# Patient Record
Sex: FEMALE | Race: WHITE | NOT HISPANIC OR LATINO | Employment: FULL TIME | URBAN - METROPOLITAN AREA
[De-identification: names, ages, dates, MRNs, and addresses within clinical notes are randomized per-mention and may not be internally consistent; named-entity substitution may affect disease eponyms.]

---

## 2018-05-09 ENCOUNTER — HOSPITAL ENCOUNTER (EMERGENCY)
Facility: HOSPITAL | Age: 33
Discharge: HOME | End: 2018-05-09

## 2018-05-09 VITALS
OXYGEN SATURATION: 100 % | DIASTOLIC BLOOD PRESSURE: 76 MMHG | HEART RATE: 81 BPM | TEMPERATURE: 98 F | WEIGHT: 167 LBS | BODY MASS INDEX: 26.84 KG/M2 | RESPIRATION RATE: 18 BRPM | HEIGHT: 66 IN | SYSTOLIC BLOOD PRESSURE: 140 MMHG

## 2018-05-09 DIAGNOSIS — R52 PAIN: ICD-10-CM

## 2018-05-09 DIAGNOSIS — S80.11XA CONTUSION OF RIGHT KNEE AND LOWER LEG, INITIAL ENCOUNTER: Primary | ICD-10-CM

## 2018-05-09 DIAGNOSIS — S80.01XA CONTUSION OF RIGHT KNEE AND LOWER LEG, INITIAL ENCOUNTER: Primary | ICD-10-CM

## 2018-05-09 PROCEDURE — 73590 X-RAY EXAM OF LOWER LEG: CPT | Mod: 26,RT,, | Performed by: RADIOLOGY

## 2018-05-09 PROCEDURE — 99283 EMERGENCY DEPT VISIT LOW MDM: CPT | Mod: 25

## 2018-05-09 PROCEDURE — 73590 X-RAY EXAM OF LOWER LEG: CPT | Mod: TC,FY,RT

## 2018-05-10 NOTE — ED TRIAGE NOTES
PATIENT C/O RIGHT SHIN PAIN AND SWELLING DUE TO HITTING RIGHT SHIN ON EDGE OF MARBLE BATH TUB YESTERDAY AFTER SLIPPING WHILE ATTEMPTING TO GET INTO BATH TUB. PATIENT REPORTS SHE HAS BEEN ON HER FEET ALL DAY, NOTICED THE SWELLING TO THE AREA, TENDER TO TOUCH. CONTUSION NOTED TO RIGHT SHIN. RATES PAIN 2/10. RESPIRATIONS EVEN AND UNLABORED. NO DISTRESS NOTED.

## 2018-05-10 NOTE — ED PROVIDER NOTES
Encounter Date: 5/9/2018       History     Chief Complaint   Patient presents with    Leg Injury     RIGHT SHIN CONTUSION, SWELLING NOTED DUE TO HITTING RIGHT SHIN ON EDGE OF MARBLE BATHTUB YESTERDAY AFTER SLIPPING ATTEMPTING TO GET IN THE BATH TUB     Hit R shin on bathtub yesterday, pain and tenderness to L shin today.  Worked all day as .            Review of patient's allergies indicates:  Allergies not on file  No past medical history on file.  No past surgical history on file.  No family history on file.  Social History   Substance Use Topics    Smoking status: Not on file    Smokeless tobacco: Not on file    Alcohol use Not on file     Review of Systems   Constitutional: Negative.    HENT: Negative.    Eyes: Negative.    Respiratory: Negative.    Cardiovascular: Negative.    Gastrointestinal: Negative.    Endocrine: Negative.    Genitourinary: Negative.    Musculoskeletal: Positive for gait problem.        Slight R antalgic gait   Skin: Positive for wound.        3 cm ecchymosis to R shin 10 cm proximal to foot.     Allergic/Immunologic: Negative.    Hematological: Negative.    Psychiatric/Behavioral: Negative.        Physical Exam     Initial Vitals [05/09/18 1902]   BP Pulse Resp Temp SpO2   (!) 140/76 81 18 97.9 °F (36.6 °C) 100 %      MAP       97.33         Physical Exam    Constitutional: She appears well-developed and well-nourished.   HENT:   Head: Normocephalic and atraumatic.   Eyes: EOM are normal. Pupils are equal, round, and reactive to light.   Neck: Normal range of motion. Neck supple.   Cardiovascular: Normal rate, regular rhythm, normal heart sounds and intact distal pulses.   Pulmonary/Chest: Breath sounds normal.   Abdominal: Soft. Bowel sounds are normal.   Musculoskeletal: Normal range of motion. She exhibits tenderness.   Tenderness to R shin   Skin: Skin is warm and dry. Capillary refill takes less than 2 seconds.         ED Course   Procedures  Labs Reviewed - No data to  display                               Clinical Impression:   The primary encounter diagnosis was Contusion of right knee and lower leg, initial encounter. A diagnosis of Pain was also pertinent to this visit.                           Hunter Neil MD  05/17/18 0768

## 2019-09-04 ENCOUNTER — HOSPITAL ENCOUNTER (EMERGENCY)
Facility: HOSPITAL | Age: 34
Discharge: HOME | End: 2019-09-04
Attending: FAMILY MEDICINE
Payer: COMMERCIAL

## 2019-09-04 VITALS
HEIGHT: 66 IN | BODY MASS INDEX: 24.91 KG/M2 | SYSTOLIC BLOOD PRESSURE: 140 MMHG | HEART RATE: 98 BPM | DIASTOLIC BLOOD PRESSURE: 75 MMHG | OXYGEN SATURATION: 97 % | RESPIRATION RATE: 20 BRPM | WEIGHT: 155 LBS | TEMPERATURE: 99 F

## 2019-09-04 DIAGNOSIS — S63.639A TRAUMATIC RUPTURE OF TENDON OF DISTAL INTERPHALANGEAL (DIP) JOINT OF FINGER, INITIAL ENCOUNTER: Primary | ICD-10-CM

## 2019-09-04 PROCEDURE — 73140 XR FINGER 2 OR MORE VIEWS LEFT: ICD-10-PCS | Mod: 26,LT,, | Performed by: RADIOLOGY

## 2019-09-04 PROCEDURE — 99284 EMERGENCY DEPT VISIT MOD MDM: CPT | Mod: 25

## 2019-09-04 PROCEDURE — 73140 X-RAY EXAM OF FINGER(S): CPT | Mod: 26,LT,, | Performed by: RADIOLOGY

## 2019-09-04 PROCEDURE — 73140 X-RAY EXAM OF FINGER(S): CPT | Mod: TC,FY,LT

## 2019-09-04 PROCEDURE — 29130 APPL FINGER SPLINT STATIC: CPT | Mod: F4

## 2019-09-04 RX ORDER — IBUPROFEN 600 MG/1
600 TABLET ORAL EVERY 6 HOURS PRN
Qty: 20 TABLET | Refills: 0 | Status: ON HOLD | OUTPATIENT
Start: 2019-09-04 | End: 2019-09-26 | Stop reason: HOSPADM

## 2019-09-04 RX ORDER — HYDROCODONE BITARTRATE AND ACETAMINOPHEN 5; 325 MG/1; MG/1
1 TABLET ORAL EVERY 6 HOURS PRN
Qty: 18 TABLET | Refills: 0 | Status: SHIPPED | OUTPATIENT
Start: 2019-09-04 | End: 2019-09-07

## 2019-09-04 NOTE — ED PROVIDER NOTES
"Encounter Date: 9/4/2019       History     Chief Complaint   Patient presents with    Finger Injury     Injured left little finger last night horse playing.     Joan Brock is a 33 y.o female with no sign PMHx. She presents to ED with complaint of left 5th finger pain.    Patient reports that she injured finger last night while "horse-playing"    Patient with moderate swelling and discoloration to distal aspect of left #5 phalanx. ROM limited    She reports "pins and needle"sensation to finger pad    NVI distally        Review of patient's allergies indicates:  No Known Allergies  History reviewed. No pertinent past medical history.  Past Surgical History:   Procedure Laterality Date    BREAST SURGERY      DILATION AND CURETTAGE OF UTERUS      HYSTERECTOMY       History reviewed. No pertinent family history.  Social History     Tobacco Use    Smoking status: Current Every Day Smoker   Substance Use Topics    Alcohol use: Yes     Comment: occ    Drug use: Never     Review of Systems   Constitutional: Negative.  Negative for fever.   HENT: Negative.  Negative for sore throat.    Eyes: Negative.    Respiratory: Negative.  Negative for shortness of breath.    Cardiovascular: Negative.  Negative for chest pain.   Gastrointestinal: Negative.  Negative for nausea.   Endocrine: Negative.    Genitourinary: Negative.  Negative for dysuria.   Musculoskeletal: Positive for arthralgias (left 5th finger injury) and joint swelling. Negative for back pain.   Skin: Negative.  Negative for rash.   Allergic/Immunologic: Negative.    Neurological: Negative.  Negative for weakness.   Hematological: Negative.  Does not bruise/bleed easily.   Psychiatric/Behavioral: Negative.    All other systems reviewed and are negative.      Physical Exam     Initial Vitals [09/04/19 1330]   BP Pulse Resp Temp SpO2   (!) 140/75 98 20 98.6 °F (37 °C) 97 %      MAP       --         Physical Exam    Nursing note and vitals " reviewed.  Constitutional: She appears well-developed and well-nourished.   HENT:   Head: Normocephalic.   Eyes: Conjunctivae are normal.   Neck: Normal range of motion.   Cardiovascular: Normal rate.   Pulmonary/Chest: Breath sounds normal.   Musculoskeletal: She exhibits edema and tenderness.        Left hand: She exhibits decreased range of motion, tenderness, bony tenderness, deformity and swelling. She exhibits normal capillary refill and no laceration. Decreased strength noted. She exhibits thumb/finger opposition. She exhibits no finger abduction and no wrist extension trouble.        Hands:  Neurological: She is alert and oriented to person, place, and time. GCS score is 15. GCS eye subscore is 4. GCS verbal subscore is 5. GCS motor subscore is 6.   Skin: Capillary refill takes less than 2 seconds.   Psychiatric: She has a normal mood and affect. Her behavior is normal. Judgment and thought content normal.         ED Course   Procedures  Labs Reviewed - No data to display       Imaging Results          X-Ray Finger 2 or More Views Left (Final result)  Result time 09/04/19 14:01:00    Final result by Rishi Ma Jr., MD (09/04/19 14:01:00)                 Impression:      Comminuted fracture of the distal phalanx of the left little finger.  A piece of the proximal phalanx articular surface is displaced proximally, probably avulsed by the flexor tendon.      Electronically signed by: Rishi Ma MD  Date:    09/04/2019  Time:    14:01             Narrative:    EXAMINATION:  XR FINGER 2 OR MORE VIEWS LEFT    CLINICAL HISTORY:  Injured left little finger last night horse playing.;    TECHNIQUE:  Three views of the left hand and fingers is provided.    COMPARISON:  None    FINDINGS:  There is comminuted fracture of the distal phalanx of the left little finger.  A significant component of the proximal end of the phalanx along the palmar surface is displaced proximally probably avulsed the to by the  "flexor tendon.                                 Medical Decision Making:   Initial Assessment:   Patient with complaint of left 5th finger pain.    Patient reports that she injured finger last night while "horse-playing"    Patient with moderate swelling and discoloration to distal aspect of left #5 phalanx. ROM limited    She reports "pins and needle"sensation to finger pad    NVI distally    Differential Diagnosis:   Finger fracture, ligament injury, dislocation, neurovascular compromise  Clinical Tests:   Radiological Study: Ordered  ED Management:  Dr. Neil reviewed XR results. He then evaluated injury    Finger splint    Ortho and hand specialist referral    Discussed physical exam findings with patient  No acute emergent medical condition identified at this time to warrant further testing/diagnostics  At this time, I believe the patient is clinically stable for discharge.   Patient to follow up with Ortho/Hand specialist in 1-2 days.  The patient acknowledges that close follow up with a MD is required after all ER visits  Pt given instructions; take all medications prescribed in the ER as directed.   Patient agrees to comply with all instruction and direction given in the ER  Pt agrees to return to ER if any symptoms reoccur           Other:   I discussed test(s) with the performing physician.                      Clinical Impression:       ICD-10-CM ICD-9-CM   1. Traumatic rupture of tendon of distal interphalangeal (DIP) joint of finger, initial encounter S63.639A 842.13                                Anh Rocha NP  09/04/19 1600    "

## 2019-09-04 NOTE — DISCHARGE INSTRUCTIONS
Ortho and hand specialist follow-up in 2 days    Splint until ortho/hand specialist follow-up    Ice    Motrin for pain. If not effective take Norco

## 2019-09-17 DIAGNOSIS — M79.642 LEFT HAND PAIN: Primary | ICD-10-CM

## 2019-09-18 ENCOUNTER — OFFICE VISIT (OUTPATIENT)
Dept: ORTHOPEDICS | Facility: CLINIC | Age: 34
End: 2019-09-18
Payer: COMMERCIAL

## 2019-09-18 ENCOUNTER — HOSPITAL ENCOUNTER (OUTPATIENT)
Dept: RADIOLOGY | Facility: HOSPITAL | Age: 34
Discharge: HOME | End: 2019-09-18
Attending: ORTHOPAEDIC SURGERY
Payer: COMMERCIAL

## 2019-09-18 VITALS
HEIGHT: 66 IN | RESPIRATION RATE: 19 BRPM | SYSTOLIC BLOOD PRESSURE: 127 MMHG | BODY MASS INDEX: 24.91 KG/M2 | WEIGHT: 155 LBS | DIASTOLIC BLOOD PRESSURE: 81 MMHG | HEART RATE: 87 BPM

## 2019-09-18 DIAGNOSIS — Z01.818 PRE-OP EXAM: Primary | ICD-10-CM

## 2019-09-18 DIAGNOSIS — M79.642 LEFT HAND PAIN: Primary | ICD-10-CM

## 2019-09-18 DIAGNOSIS — S62.637A CLOSED DISPLACED FRACTURE OF DISTAL PHALANX OF LEFT LITTLE FINGER, INITIAL ENCOUNTER: ICD-10-CM

## 2019-09-18 DIAGNOSIS — M79.643 PAIN OF HAND, UNSPECIFIED LATERALITY: ICD-10-CM

## 2019-09-18 DIAGNOSIS — M79.642 LEFT HAND PAIN: ICD-10-CM

## 2019-09-18 DIAGNOSIS — S62.609A FINGER FRACTURE, LEFT: ICD-10-CM

## 2019-09-18 DIAGNOSIS — S62.637A DISPLACED FRACTURE OF DISTAL PHALANX OF LEFT LITTLE FINGER, INITIAL ENCOUNTER FOR CLOSED FRACTURE: Primary | ICD-10-CM

## 2019-09-18 PROBLEM — S62.631A: Status: ACTIVE | Noted: 2019-09-18

## 2019-09-18 PROCEDURE — 99204 OFFICE O/P NEW MOD 45 MIN: CPT | Mod: 57,S$GLB,, | Performed by: ORTHOPAEDIC SURGERY

## 2019-09-18 PROCEDURE — 73130 X-RAY EXAM OF HAND: CPT | Mod: TC,PN,LT

## 2019-09-18 PROCEDURE — 73130 XR HAND COMPLETE 3 VIEW LEFT: ICD-10-PCS | Mod: 26,LT,, | Performed by: RADIOLOGY

## 2019-09-18 PROCEDURE — 99999 PR PBB SHADOW E&M-EST. PATIENT-LVL III: CPT | Mod: PBBFAC,,, | Performed by: ORTHOPAEDIC SURGERY

## 2019-09-18 PROCEDURE — 99204 PR OFFICE/OUTPT VISIT, NEW, LEVL IV, 45-59 MIN: ICD-10-PCS | Mod: 57,S$GLB,, | Performed by: ORTHOPAEDIC SURGERY

## 2019-09-18 PROCEDURE — 73130 X-RAY EXAM OF HAND: CPT | Mod: 26,LT,, | Performed by: RADIOLOGY

## 2019-09-18 PROCEDURE — 99999 PR PBB SHADOW E&M-EST. PATIENT-LVL III: ICD-10-PCS | Mod: PBBFAC,,, | Performed by: ORTHOPAEDIC SURGERY

## 2019-09-18 RX ORDER — MUPIROCIN 20 MG/G
OINTMENT TOPICAL
Status: CANCELLED | OUTPATIENT
Start: 2019-09-18

## 2019-09-18 RX ORDER — SODIUM CHLORIDE 9 MG/ML
INJECTION, SOLUTION INTRAVENOUS CONTINUOUS
Status: CANCELLED | OUTPATIENT
Start: 2019-09-18

## 2019-09-18 NOTE — LETTER
September 18, 2019      Anh Rocha NP  [address] [address]           [facility]  [address], [address]  [place] [address]  Phone: [phone]  Fax: [phone]          Patient: Joan Brock   MR Number: [id number]   YOB: 1985   Date of Visit: 9/18/2019       Dear Anh Rocha:    Thank you for referring Joan Brock to me for evaluation. Attached you will find relevant portions of my assessment and plan of care.    If you have questions, please do not hesitate to call me. I look forward to following Joan Brock along with you.    Sincerely,    Lucian Fairchild, DO    Enclosure  CC:  No Recipients    If you would like to receive this communication electronically, please contact [email or web address] or [phone] to request more information on [facility] access.    For providers and/or their staff who would like to refer a patient to [facility], please contact us through our one-stop-shop provider referral line, Francisco Mathis, at [phone].    If you feel you have received this communication in error or would no longer like to receive these types of communications, please e-mail [email or web address]
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
## 2019-09-23 ENCOUNTER — ANESTHESIA EVENT (OUTPATIENT)
Dept: SURGERY | Facility: HOSPITAL | Age: 34
End: 2019-09-23
Payer: COMMERCIAL

## 2019-09-23 ENCOUNTER — HOSPITAL ENCOUNTER (OUTPATIENT)
Dept: PREADMISSION TESTING | Facility: HOSPITAL | Age: 34
Discharge: HOME | End: 2019-09-23
Attending: ORTHOPAEDIC SURGERY
Payer: COMMERCIAL

## 2019-09-23 VITALS
BODY MASS INDEX: 24.75 KG/M2 | HEART RATE: 62 BPM | DIASTOLIC BLOOD PRESSURE: 71 MMHG | HEIGHT: 66 IN | SYSTOLIC BLOOD PRESSURE: 112 MMHG | WEIGHT: 154 LBS | OXYGEN SATURATION: 100 %

## 2019-09-23 PROCEDURE — 99900103 DSU ONLY-NO CHARGE-INITIAL HR (STAT)

## 2019-09-23 RX ORDER — SODIUM CHLORIDE, SODIUM LACTATE, POTASSIUM CHLORIDE, CALCIUM CHLORIDE 600; 310; 30; 20 MG/100ML; MG/100ML; MG/100ML; MG/100ML
INJECTION, SOLUTION INTRAVENOUS CONTINUOUS
Status: CANCELLED | OUTPATIENT
Start: 2019-09-23

## 2019-09-23 RX ORDER — HYDROCODONE BITARTRATE AND ACETAMINOPHEN 5; 325 MG/1; MG/1
1 TABLET ORAL EVERY 6 HOURS PRN
Status: ON HOLD | COMMUNITY
End: 2019-09-26 | Stop reason: HOSPADM

## 2019-09-23 NOTE — DISCHARGE INSTRUCTIONS
1. Someone will call you the day before your surgery with the time to arrive for your surgery.  2. Do not eat or drink anything after midnight the night before your surgery.  3. Have a ride home after your procedure.  4. Do not bring any valuables to the hospital with you.  5. Wear clothing big enough to fit over a post-op dressing.  6. Remove contact lenses, retainers, dentures, partials and ALL jewelry prior to procedure.  7. You need someone to stay with you 24 hours after your procedure/anesthesia.  8. You may not drive or operate heavy machinery 24 hours after you have had anesthesia.  9. Surgery dept. phone number [phone].        HIBICLENS INSTRUCTIONS     You will wash the area of your body having surgery twice with this soap. The first should be taken the night before your surgery/procedure and the second the morning of surgery/procedure.   Do not shave the area of your body where your surgery will be performed. Any new cut, abrasion or rash on your surgical site will need to be evaluated and may cause a delay in your procedure.   Turn water off before applying the hibiclens soap to prevent rinsing it off too soon. Apply the soap to your entire body from the jaw down, using a clean washcloth or your hands. Do not use hibiclens near your eyes, ears, nose or mouth.   Wash thoroughly for three minutes, paying special attention to the area where your surgery will be performed. Do not scrub your skin too hard. Do not wash with your regular soap after using the hibiclens. Turn the water back on and rinse your body well.   Pat yourself dry with a fresh, clean, soft towel after each shower. Put on clean clothes or pajamas. Use freshly laundered bed linens for the first night.   Do not apply any lotions, perfumes or powders after use.

## 2019-09-23 NOTE — ANESTHESIA PREPROCEDURE EVALUATION
09/23/2019  Anna Brock is a 33 y.o., female.    Anesthesia Evaluation    I have reviewed the Patient Summary Reports.    I have reviewed the Nursing Notes.   I have reviewed the Medications.     Review of Systems  Social:  Smoker    Hematology/Oncology:  Hematology Normal   Oncology Normal     EENT/Dental:EENT/Dental Normal   Cardiovascular:  Cardiovascular Normal     Renal/:  Renal/ Normal     Hepatic/GI:  Hepatic/GI Normal    Musculoskeletal:  Musculoskeletal Normal    Neurological:  Neurology Normal    Endocrine:  Endocrine Normal    Dermatological:  Skin Normal    Psych:  Psychiatric Normal           Physical Exam  General:  Well nourished    Airway/Jaw/Neck:  Airway Findings: Mouth Opening: Normal Tongue: Normal  General Airway Assessment: Adult  Mallampati: II  TM Distance: Normal, at least 6 cm       Chest/Lungs:  Chest/Lungs Findings: Clear to auscultation     Heart/Vascular:  Heart Findings: Rate: Normal  Rhythm: Regular Rhythm        Mental Status:  Mental Status Findings:  Cooperative, Alert and Oriented         Anesthesia Plan  Type of Anesthesia, risks & benefits discussed:  Anesthesia Type:  general  Patient's Preference:   Intra-op Monitoring Plan: standard ASA monitors  Intra-op Monitoring Plan Comments:   Post Op Pain Control Plan: IV/PO Opioids PRN  Post Op Pain Control Plan Comments:   Induction:   IV  Beta Blocker:  Patient is not currently on a Beta-Blocker (No further documentation required).       Informed Consent: Patient understands risks and agrees with Anesthesia plan.  Questions answered. Anesthesia consent signed with patient.  ASA Score: 2     Day of Surgery Review of History & Physical: I have interviewed and examined the patient. I have reviewed the patient's H&P dated:            Ready For Surgery From Anesthesia Perspective.

## 2019-09-26 ENCOUNTER — HOSPITAL ENCOUNTER (OUTPATIENT)
Facility: HOSPITAL | Age: 34
Discharge: HOME | End: 2019-09-26
Attending: ORTHOPAEDIC SURGERY | Admitting: ORTHOPAEDIC SURGERY
Payer: COMMERCIAL

## 2019-09-26 ENCOUNTER — ANESTHESIA (OUTPATIENT)
Dept: SURGERY | Facility: HOSPITAL | Age: 34
End: 2019-09-26
Payer: COMMERCIAL

## 2019-09-26 VITALS
RESPIRATION RATE: 18 BRPM | HEART RATE: 70 BPM | DIASTOLIC BLOOD PRESSURE: 83 MMHG | SYSTOLIC BLOOD PRESSURE: 119 MMHG | OXYGEN SATURATION: 100 % | TEMPERATURE: 98 F | BODY MASS INDEX: 24.86 KG/M2 | WEIGHT: 154 LBS

## 2019-09-26 DIAGNOSIS — S62.609A FINGER FRACTURE, LEFT: Primary | ICD-10-CM

## 2019-09-26 DIAGNOSIS — S62.637A CLOSED DISPLACED FRACTURE OF DISTAL PHALANX OF LEFT LITTLE FINGER, INITIAL ENCOUNTER: ICD-10-CM

## 2019-09-26 DIAGNOSIS — Z01.818 PRE-OP EXAM: ICD-10-CM

## 2019-09-26 PROCEDURE — 63600175 PHARM REV CODE 636 W HCPCS: Performed by: ORTHOPAEDIC SURGERY

## 2019-09-26 PROCEDURE — S0028 INJECTION, FAMOTIDINE, 20 MG: HCPCS | Performed by: ANESTHESIOLOGY

## 2019-09-26 PROCEDURE — D9220A PRA ANESTHESIA: Mod: CRNA,,, | Performed by: NURSE ANESTHETIST, CERTIFIED REGISTERED

## 2019-09-26 PROCEDURE — 37000009 HC ANESTHESIA EA ADD 15 MINS: Performed by: ORTHOPAEDIC SURGERY

## 2019-09-26 PROCEDURE — 36000709 HC OR TIME LEV III EA ADD 15 MIN: Performed by: ORTHOPAEDIC SURGERY

## 2019-09-26 PROCEDURE — 26765 TREAT FINGER FRACTURE EACH: CPT | Mod: F7,,, | Performed by: ORTHOPAEDIC SURGERY

## 2019-09-26 PROCEDURE — 37000008 HC ANESTHESIA 1ST 15 MINUTES: Performed by: ORTHOPAEDIC SURGERY

## 2019-09-26 PROCEDURE — D9220A PRA ANESTHESIA: ICD-10-PCS | Mod: ANES,,, | Performed by: ANESTHESIOLOGY

## 2019-09-26 PROCEDURE — D9220A PRA ANESTHESIA: Mod: ANES,,, | Performed by: ANESTHESIOLOGY

## 2019-09-26 PROCEDURE — 36000708 HC OR TIME LEV III 1ST 15 MIN: Performed by: ORTHOPAEDIC SURGERY

## 2019-09-26 PROCEDURE — C1769 GUIDE WIRE: HCPCS | Performed by: ORTHOPAEDIC SURGERY

## 2019-09-26 PROCEDURE — D9220A PRA ANESTHESIA: ICD-10-PCS | Mod: CRNA,,, | Performed by: NURSE ANESTHETIST, CERTIFIED REGISTERED

## 2019-09-26 PROCEDURE — 63600175 PHARM REV CODE 636 W HCPCS: Performed by: NURSE ANESTHETIST, CERTIFIED REGISTERED

## 2019-09-26 PROCEDURE — 71000033 HC RECOVERY, INTIAL HOUR: Performed by: ORTHOPAEDIC SURGERY

## 2019-09-26 PROCEDURE — 26765 PR OPEN TX DISTAL PHALANGEAL FRACTURE EACH: ICD-10-PCS | Mod: F7,,, | Performed by: ORTHOPAEDIC SURGERY

## 2019-09-26 PROCEDURE — 25000003 PHARM REV CODE 250: Performed by: ANESTHESIOLOGY

## 2019-09-26 PROCEDURE — 25000003 PHARM REV CODE 250: Performed by: ORTHOPAEDIC SURGERY

## 2019-09-26 PROCEDURE — 71000015 HC POSTOP RECOV 1ST HR: Performed by: ORTHOPAEDIC SURGERY

## 2019-09-26 RX ORDER — ONDANSETRON 2 MG/ML
INJECTION INTRAMUSCULAR; INTRAVENOUS
Status: DISCONTINUED | OUTPATIENT
Start: 2019-09-26 | End: 2019-09-26

## 2019-09-26 RX ORDER — MIDAZOLAM HYDROCHLORIDE 1 MG/ML
INJECTION, SOLUTION INTRAMUSCULAR; INTRAVENOUS
Status: DISCONTINUED | OUTPATIENT
Start: 2019-09-26 | End: 2019-09-26

## 2019-09-26 RX ORDER — DIPHENHYDRAMINE HYDROCHLORIDE 50 MG/ML
12.5 INJECTION INTRAMUSCULAR; INTRAVENOUS
Status: ACTIVE | OUTPATIENT
Start: 2019-09-26

## 2019-09-26 RX ORDER — FAMOTIDINE 10 MG/ML
INJECTION INTRAVENOUS
Status: DISCONTINUED
Start: 2019-09-26 | End: 2019-09-26 | Stop reason: HOSPADM

## 2019-09-26 RX ORDER — SODIUM CHLORIDE, SODIUM LACTATE, POTASSIUM CHLORIDE, CALCIUM CHLORIDE 600; 310; 30; 20 MG/100ML; MG/100ML; MG/100ML; MG/100ML
125 INJECTION, SOLUTION INTRAVENOUS CONTINUOUS
Status: ACTIVE | OUTPATIENT
Start: 2019-09-26

## 2019-09-26 RX ORDER — PROPOFOL 10 MG/ML
VIAL (ML) INTRAVENOUS
Status: DISCONTINUED | OUTPATIENT
Start: 2019-09-26 | End: 2019-09-26

## 2019-09-26 RX ORDER — MUPIROCIN 20 MG/G
OINTMENT TOPICAL
Status: DISCONTINUED | OUTPATIENT
Start: 2019-09-26 | End: 2019-09-26 | Stop reason: HOSPADM

## 2019-09-26 RX ORDER — FAMOTIDINE 10 MG/ML
20 INJECTION INTRAVENOUS ONCE
Status: COMPLETED | OUTPATIENT
Start: 2019-09-26 | End: 2019-09-26

## 2019-09-26 RX ORDER — MORPHINE SULFATE 4 MG/ML
2 INJECTION, SOLUTION INTRAMUSCULAR; INTRAVENOUS EVERY 5 MIN PRN
Status: ACTIVE | OUTPATIENT
Start: 2019-09-26

## 2019-09-26 RX ORDER — SODIUM CHLORIDE 9 MG/ML
INJECTION, SOLUTION INTRAVENOUS CONTINUOUS
Status: DISCONTINUED | OUTPATIENT
Start: 2019-09-26 | End: 2019-09-26 | Stop reason: HOSPADM

## 2019-09-26 RX ORDER — CEFAZOLIN SODIUM 2 G/50ML
2 SOLUTION INTRAVENOUS
Status: COMPLETED | OUTPATIENT
Start: 2019-09-26 | End: 2019-09-26

## 2019-09-26 RX ORDER — MEPERIDINE HYDROCHLORIDE 50 MG/ML
INJECTION INTRAMUSCULAR; INTRAVENOUS; SUBCUTANEOUS
Status: DISCONTINUED | OUTPATIENT
Start: 2019-09-26 | End: 2019-09-26

## 2019-09-26 RX ORDER — BUPIVACAINE HYDROCHLORIDE 2.5 MG/ML
INJECTION, SOLUTION EPIDURAL; INFILTRATION; INTRACAUDAL
Status: DISCONTINUED | OUTPATIENT
Start: 2019-09-26 | End: 2019-09-26 | Stop reason: HOSPADM

## 2019-09-26 RX ORDER — LIDOCAINE HYDROCHLORIDE 10 MG/ML
1 INJECTION, SOLUTION EPIDURAL; INFILTRATION; INTRACAUDAL; PERINEURAL ONCE
Status: ACTIVE | OUTPATIENT
Start: 2019-09-26

## 2019-09-26 RX ORDER — ONDANSETRON 2 MG/ML
4 INJECTION INTRAMUSCULAR; INTRAVENOUS DAILY PRN
Status: ACTIVE | OUTPATIENT
Start: 2019-09-26

## 2019-09-26 RX ADMIN — MEPERIDINE HYDROCHLORIDE 25 MG: 50 INJECTION INTRAMUSCULAR; INTRAVENOUS; SUBCUTANEOUS at 12:09

## 2019-09-26 RX ADMIN — PROPOFOL 200 MG: 10 INJECTION, EMULSION INTRAVENOUS at 11:09

## 2019-09-26 RX ADMIN — SODIUM CHLORIDE 1000 ML: 0.9 INJECTION, SOLUTION INTRAVENOUS at 10:09

## 2019-09-26 RX ADMIN — FAMOTIDINE 20 MG: 10 INJECTION INTRAVENOUS at 10:09

## 2019-09-26 RX ADMIN — MIDAZOLAM HYDROCHLORIDE 2 MG: 1 INJECTION, SOLUTION INTRAMUSCULAR; INTRAVENOUS at 11:09

## 2019-09-26 RX ADMIN — MUPIROCIN: 20 OINTMENT TOPICAL at 09:09

## 2019-09-26 RX ADMIN — ONDANSETRON 4 MG: 2 INJECTION INTRAMUSCULAR; INTRAVENOUS at 12:09

## 2019-09-26 RX ADMIN — CEFAZOLIN SODIUM 2 G: 2 SOLUTION INTRAVENOUS at 11:09

## 2019-09-26 NOTE — DISCHARGE INSTRUCTIONS
Managing Post-Op Pain at Home: Medicines    Pain after an operation (post-op pain) is common and expected. These guidelines can help you stay as comfortable as possible.  Taking pain medicines  · Take medicines on time. Do not take more than prescribed.  · Take only the medicines that your healthcare provider tells you to take.  · Take pain medicines with some food to avoid an upset stomach.  · Dont drink alcohol while using pain medicines.  · Do not drive while taking opioid pain medicines.   Types of pain medicines  Non-opioid  · Over-the-counter (such as acetaminophen and ibuprofen) or prescription  · All relieve mild to moderate pain and some reduce swelling  · Possible side effects include stomach upset and bleeding, high doses may cause kidney or liver problems  · Check with your healthcare provider before taking over-the-counter pain medicines in addition to your prescribed pain medicine  Opioid  · Always a prescription  · Relieve moderate to severe pain  · Possible side effects include stomach upset, nausea, and itching  · May cause constipation (to help prevent this, eat high-fiber foods and drink plenty of water)  · Your healthcare provider may recommend a stool softener  When to call your healthcare provider  Call your healthcare provider or seek immediate attention if you notice any of these symptoms:  · Nausea, vomiting, diarrhea, lasting constipation, or stomach cramps  · Breathing problems or a fast heart rate  · Feeling very tired, sluggish, or dizzy  · Skin rash   Date Last Reviewed: 12/1/2016  © 5129-5129 [employer]. [address] [place] [address], [place] [address]. All rights reserved. This information is not intended as a substitute for professional medical care. Always follow your healthcare professional's instructions.        Discharge Instructions: After Your Surgery  Youve just had surgery. During surgery, you were given medicine called anesthesia to keep you relaxed and free of  pain. After surgery, you may have some pain or nausea. This is common. Here are some tips for feeling better and getting well after surgery.     Stay on schedule with your medicine.   Going home  Your healthcare provider will show you how to take care of yourself when you go home. He or she will also answer your questions. Have an adult family member or friend drive you home. For the first 24 hours after your surgery:  · Do not drive or use heavy equipment.  · Do not make important decisions or sign legal papers.  · Do not drink alcohol.  · Have someone stay with you, if needed. He or she can watch for problems and help keep you safe.  Be sure to go to all follow-up visits with your healthcare provider. And rest after your surgery for as long as your healthcare provider tells you to.  Coping with pain  If you have pain after surgery, pain medicine will help you feel better. Take it as told, before pain becomes severe. Also, ask your healthcare provider or pharmacist about other ways to control pain. This might be with heat, ice, or relaxation. And follow any other instructions your surgeon or nurse gives you.  Tips for taking pain medicine  To get the best relief possible, remember these points:  · Pain medicines can upset your stomach. Taking them with a little food may help.  · Most pain relievers taken by mouth need at least 20 to 30 minutes to start to work.  · Taking medicine on a schedule can help you remember to take it. Try to time your medicine so that you can take it before starting an activity. This might be before you get dressed, go for a walk, or sit down for dinner.  · Constipation is a common side effect of pain medicines. Call your healthcare provider before taking any medicines such as laxatives or stool softeners to help ease constipation. Also ask if you should skip any foods. Drinking lots of fluids and eating foods such as fruits and vegetables that are high in fiber can also help. Remember, do  not take laxatives unless your surgeon has prescribed them.  · Drinking alcohol and taking pain medicine can cause dizziness and slow your breathing. It can even be deadly. Do not drink alcohol while taking pain medicine.  · Pain medicine can make you react more slowly to things. Do not drive or run machinery while taking pain medicine.  Your healthcare provider may tell you to take acetaminophen to help ease your pain. Ask him or her how much you are supposed to take each day. Acetaminophen or other pain relievers may interact with your prescription medicines or other over-the-counter (OTC) medicines. Some prescription medicines have acetaminophen and other ingredients. Using both prescription and OTC acetaminophen for pain can cause you to overdose. Read the labels on your OTC medicines with care. This will help you to clearly know the list of ingredients, how much to take, and any warnings. It may also help you not take too much acetaminophen. If you have questions or do not understand the information, ask your pharmacist or healthcare provider to explain it to you before you take the OTC medicine.  Managing nausea  Some people have an upset stomach after surgery. This is often because of anesthesia, pain, or pain medicine, or the stress of surgery. These tips will help you handle nausea and eat healthy foods as you get better. If you were on a special food plan before surgery, ask your healthcare provider if you should follow it while you get better. These tips may help:  · Do not push yourself to eat. Your body will tell you when to eat and how much.  · Start off with clear liquids and soup. They are easier to digest.  · Next try semi-solid foods, such as mashed potatoes, applesauce, and gelatin, as you feel ready.  · Slowly move to solid foods. Dont eat fatty, rich, or spicy foods at first.  · Do not force yourself to have 3 large meals a day. Instead eat smaller amounts more often.  · Take pain medicines  with a small amount of solid food, such as crackers or toast, to avoid nausea.     Call your surgeon if  · You still have pain an hour after taking medicine. The medicine may not be strong enough.  · You feel too sleepy, dizzy, or groggy. The medicine may be too strong.  · You have side effects like nausea, vomiting, or skin changes, such as rash, itching, or hives.       If you have obstructive sleep apnea  You were given anesthesia medicine during surgery to keep you comfortable and free of pain. After surgery, you may have more apnea spells because of this medicine and other medicines you were given. The spells may last longer than usual.   At home:  · Keep using the continuous positive airway pressure (CPAP) device when you sleep. Unless your healthcare provider tells you not to, use it when you sleep, day or night. CPAP is a common device used to treat obstructive sleep apnea.  · Talk with your provider before taking any pain medicine, muscle relaxants, or sedatives. Your provider will tell you about the possible dangers of taking these medicines.  Date Last Reviewed: 12/1/2016 © 2000-2017 [employer]. [address] [place] [address], [place] [address]. All rights reserved. This information is not intended as a substitute for professional medical care. Always follow your healthcare professional's instructions.

## 2019-09-26 NOTE — PLAN OF CARE
Patient awake and alert. VSS. No complaints of pain or discomfort. Tolerating liquids without any N/V.Discharge teaching complete. All questions answered and necessary handouts provided. Doctor spoke with patient and family.

## 2019-09-26 NOTE — DISCHARGE SUMMARY
Ms. Brock was admitted through same-day surgery and was brought to the operating room where under an open reduction internal fixation of a distal phalanx of her left small finger was completed.  For full account of surgery please see the operative report.  Postoperatively the patient was transferred from the operating to the recovery room and when alert awake and oriented was discharged home in stable condition with instructions to follow up in 10-12 days

## 2019-09-26 NOTE — TRANSFER OF CARE
Anesthesia Transfer of Care Note    Patient: Anna Brock    Procedure(s) Performed: Procedure(s) (LRB):  ORIF, FINGER (Left)    Patient location: PACU    Anesthesia Type: general    Transport from OR: Transported from OR on room air with adequate spontaneous ventilation    Post pain: adequate analgesia    Post assessment: no apparent anesthetic complications and tolerated procedure well    Post vital signs: stable    Level of consciousness: awake, alert and oriented    Nausea/Vomiting: no nausea/vomiting    Complications: none    Transfer of care protocol was followed      Last vitals:   Visit Vitals  /60 (BP Location: Right arm, Patient Position: Lying)   Pulse 60   Temp 36.7 °C (98 °F) (Oral)   Resp 18   Wt 69.9 kg (154 lb)   SpO2 100%   Breastfeeding? No   BMI 24.86 kg/m²

## 2019-09-26 NOTE — INTERVAL H&P NOTE
The patient has been examined and the H&P has been reviewed:  Operative extremity signed at 10:15 a.m., H&P updated 10:19 a.m..  Patient rate to roll to operating room at 10:19 a.m..    I concur with the findings and no changes have occurred since H&P was written.    Anesthesia/Surgery risks, benefits and alternative options discussed and understood by patient/family.          Active Hospital Problems    Diagnosis  POA    Finger fracture, left [H87.318P]  Yes      Resolved Hospital Problems   No resolved problems to display.

## 2019-09-26 NOTE — ANESTHESIA POSTPROCEDURE EVALUATION
Anesthesia Post Evaluation    Patient: Anna Brock    Procedure(s) Performed: Procedure(s) (LRB):  ORIF, FINGER (Left)    Final Anesthesia Type: general  Patient location during evaluation: PACU  Patient participation: Yes- Able to Participate  Level of consciousness: awake and alert  Post-procedure vital signs: reviewed and stable  Pain management: adequate  Airway patency: patent  PONV status at discharge: No PONV  Anesthetic complications: no      Cardiovascular status: blood pressure returned to baseline  Respiratory status: unassisted  Hydration status: euvolemic  Follow-up not needed.          Vitals Value Taken Time   /83 9/26/2019  2:32 PM   Temp 36.7 °C (98.1 °F) 9/26/2019  1:45 PM   Pulse 70 9/26/2019  2:45 PM   Resp 18 9/26/2019  2:45 PM   SpO2 100 % 9/26/2019  2:45 PM         Event Time     Out of Recovery 14:39:17          Pain/Kiki Score: Kiki Score: 10 (9/26/2019  2:45 PM)
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
## 2019-09-27 ENCOUNTER — PATIENT MESSAGE (OUTPATIENT)
Dept: ORTHOPEDICS | Facility: CLINIC | Age: 34
End: 2019-09-27

## 2019-09-30 ENCOUNTER — TELEPHONE (OUTPATIENT)
Dept: ORTHOPEDICS | Facility: CLINIC | Age: 34
End: 2019-09-30

## 2019-09-30 ENCOUNTER — PATIENT MESSAGE (OUTPATIENT)
Dept: ORTHOPEDICS | Facility: CLINIC | Age: 34
End: 2019-09-30

## 2019-09-30 DIAGNOSIS — M79.642 LEFT HAND PAIN: Primary | ICD-10-CM

## 2019-09-30 NOTE — TELEPHONE ENCOUNTER
Called patient to see how she is doing after surgery with Gurinder and to verify her post op appointment.     Patient voiced that she is doing well after surgery. Post op appointment verified.     Encouraged patient to call office if she has any questions or concerns.

## 2019-10-03 ENCOUNTER — PATIENT MESSAGE (OUTPATIENT)
Dept: ORTHOPEDICS | Facility: CLINIC | Age: 34
End: 2019-10-03

## 2019-10-06 ENCOUNTER — PATIENT MESSAGE (OUTPATIENT)
Dept: ORTHOPEDICS | Facility: CLINIC | Age: 34
End: 2019-10-06

## 2019-10-07 ENCOUNTER — HOSPITAL ENCOUNTER (OUTPATIENT)
Dept: RADIOLOGY | Facility: HOSPITAL | Age: 34
Discharge: HOME | End: 2019-10-07
Attending: ORTHOPAEDIC SURGERY
Payer: COMMERCIAL

## 2019-10-07 ENCOUNTER — OFFICE VISIT (OUTPATIENT)
Dept: ORTHOPEDICS | Facility: CLINIC | Age: 34
End: 2019-10-07
Payer: COMMERCIAL

## 2019-10-07 VITALS
HEIGHT: 66 IN | DIASTOLIC BLOOD PRESSURE: 81 MMHG | RESPIRATION RATE: 18 BRPM | WEIGHT: 154.13 LBS | HEART RATE: 70 BPM | SYSTOLIC BLOOD PRESSURE: 134 MMHG | BODY MASS INDEX: 24.77 KG/M2

## 2019-10-07 DIAGNOSIS — M79.642 LEFT HAND PAIN: ICD-10-CM

## 2019-10-07 DIAGNOSIS — Z51.89 AFTER CARE: Primary | ICD-10-CM

## 2019-10-07 PROCEDURE — 99999 PR PBB SHADOW E&M-EST. PATIENT-LVL III: CPT | Mod: PBBFAC,,, | Performed by: ORTHOPAEDIC SURGERY

## 2019-10-07 PROCEDURE — 73130 X-RAY EXAM OF HAND: CPT | Mod: TC,FY,LT

## 2019-10-07 PROCEDURE — 73130 X-RAY EXAM OF HAND: CPT | Mod: 26,LT,, | Performed by: RADIOLOGY

## 2019-10-07 PROCEDURE — 99024 POSTOP FOLLOW-UP VISIT: CPT | Mod: S$GLB,,, | Performed by: ORTHOPAEDIC SURGERY

## 2019-10-07 PROCEDURE — 99999 PR PBB SHADOW E&M-EST. PATIENT-LVL III: ICD-10-PCS | Mod: PBBFAC,,, | Performed by: ORTHOPAEDIC SURGERY

## 2019-10-07 PROCEDURE — 73130 XR HAND COMPLETE 3 VIEW LEFT: ICD-10-PCS | Mod: 26,LT,, | Performed by: RADIOLOGY

## 2019-10-07 PROCEDURE — 99024 PR POST-OP FOLLOW-UP VISIT: ICD-10-PCS | Mod: S$GLB,,, | Performed by: ORTHOPAEDIC SURGERY

## 2019-10-07 RX ORDER — HYDROCODONE BITARTRATE AND ACETAMINOPHEN 7.5; 325 MG/1; MG/1
1 TABLET ORAL
Refills: 0 | COMMUNITY
Start: 2019-09-26 | End: 2019-10-28

## 2019-10-07 RX ORDER — HYDROCODONE BITARTRATE AND ACETAMINOPHEN 5; 325 MG/1; MG/1
1 TABLET ORAL EVERY 6 HOURS PRN
COMMUNITY
End: 2019-10-28

## 2019-10-07 RX ORDER — CEPHALEXIN 500 MG/1
CAPSULE ORAL
Refills: 0 | COMMUNITY
Start: 2019-09-26 | End: 2019-10-28

## 2019-10-08 NOTE — PROGRESS NOTES
Subjective:      Patient ID: Anna Brock is a 34 y.o. female.    Chief Complaint: Injury and Post-op Evaluation of the Left Hand      HPI: Ms. Brock returns today for 1st postop visit on open reduction internal fixation of a distal phalanx fracture of her left small finger.  She stated that she thinks she had a reaction to the Keflex so she stopped taking it.  She also now has a yeast infection.  She still has some pain in her finger.  Date of surgery 09/26/2019    ROS:  New diagnosis/surgery/prescriptions since last office visit on 09/18/2019:  ORIF distal phalanx left small finger.  Constitution: Negative for chills and fever.   HENT: Negative for congestion.    Eyes: Negative for blurred vision.   Cardiovascular: Negative for chest pain.   Respiratory: Negative for cough.    Endocrine: Negative for polydipsia.   Hematologic/Lymphatic: Negative for adenopathy.   Skin: Negative for flushing and itching.   Musculoskeletal: Positive for joint pain and joint swelling. Negative for gout.   Gastrointestinal: Positive for heartburn. Negative for constipation and diarrhea.   Genitourinary: Negative for nocturia.   Neurological: Positive for headaches. Negative for seizures.   Psychiatric/Behavioral: Negative for depression. The patient is not nervous/anxious.    Allergic/Immunologic: Positive for environmental allergies.       Objective:      Physical Exam:   General: AAOx3.  No acute distress  Vascular:  Pulses intact and equal bilaterally.  Capillary refill less than 3 seconds and equal bilaterally  Neurologic:  Pinprick and soft touch intact and equal bilaterally  Integment:  No ecchymosis, no errythema.  Incisions well approximated with sutures in place. No erythema.  Extremity:  Small finger:  Pin protrudes from the tip of the left small finger.  Minimal swelling left small finger.  Mild tenderness with palpation.  No motion at the DIP of the left small finger secondary to internal splinting.  No  purulence.  Radiography:  Personally reviewed x-rays of the left hand completed on 10/07/2019 showed a pin holding a comminuted distal phalanx fracture with near anatomic alignment.      Assessment:       Impression:  ORIF distal phalanx, left small finger.      Plan:       1.  Discussed physical examination and radiographic findings with the patient. Anna understands that she appears to be well aligned and does not have an infection.  2.  Since the patient developed a yeast infection will treated with Diflucan 150 mg, 1 p.o., dispense 1, refill 0.  3.  Since she is having some persistent pain Norco 5/325, 1 p.o. q.4-6 hours p.r.n. pain, dispense 20, refill 0.  The patient understands this will be her last prescription for narcotics.  4.  Remove sutures and place Steri-Strips.  5.  Re-dress in place with Alumafoam splint splints were contouring given to her.  Patient was also instructed on pin care.  6.  Continue to elevate.  7.  One-handed activities with the right hand only, no lifting/pushing/pulling utilizing the left hand.  The patient can return to work with these restrictions and she also must be allowed to keep her left hand clean and dry.  8.  [facility] portal was discussed with the patient and information was given.  The patient was encouraged to use the portal for future encounters.  9.  Follow up in 1 month with an x-ray of the right hand.

## 2019-10-09 ENCOUNTER — PATIENT MESSAGE (OUTPATIENT)
Dept: ORTHOPEDICS | Facility: CLINIC | Age: 34
End: 2019-10-09

## 2019-10-10 ENCOUNTER — PATIENT MESSAGE (OUTPATIENT)
Dept: ORTHOPEDICS | Facility: CLINIC | Age: 34
End: 2019-10-10

## 2019-10-16 ENCOUNTER — HOSPITAL ENCOUNTER (EMERGENCY)
Facility: HOSPITAL | Age: 34
Discharge: HOME | End: 2019-10-16
Attending: FAMILY MEDICINE
Payer: COMMERCIAL

## 2019-10-16 ENCOUNTER — PATIENT MESSAGE (OUTPATIENT)
Dept: ORTHOPEDICS | Facility: CLINIC | Age: 34
End: 2019-10-16

## 2019-10-16 VITALS
TEMPERATURE: 98 F | WEIGHT: 156 LBS | HEIGHT: 66 IN | SYSTOLIC BLOOD PRESSURE: 142 MMHG | BODY MASS INDEX: 25.07 KG/M2 | OXYGEN SATURATION: 100 % | DIASTOLIC BLOOD PRESSURE: 91 MMHG | HEART RATE: 96 BPM | RESPIRATION RATE: 16 BRPM

## 2019-10-16 DIAGNOSIS — M79.645 PAIN OF FINGER OF LEFT HAND: Primary | ICD-10-CM

## 2019-10-16 LAB
BASOPHILS # BLD AUTO: 0.06 K/UL (ref 0–0.2)
BASOPHILS NFR BLD: 0.7 % (ref 0–1.9)
DIFFERENTIAL METHOD: ABNORMAL
EOSINOPHIL # BLD AUTO: 0.2 K/UL (ref 0–0.5)
EOSINOPHIL NFR BLD: 2.2 % (ref 0–8)
ERYTHROCYTE [DISTWIDTH] IN BLOOD BY AUTOMATED COUNT: 12.3 % (ref 11.5–14.5)
HCT VFR BLD AUTO: 42.1 % (ref 37–48.5)
HGB BLD-MCNC: 14.3 G/DL (ref 12–16)
IMM GRANULOCYTES # BLD AUTO: 0.02 K/UL (ref 0–0.04)
IMM GRANULOCYTES NFR BLD AUTO: 0.2 % (ref 0–0.5)
LYMPHOCYTES # BLD AUTO: 2.5 K/UL (ref 1–4.8)
LYMPHOCYTES NFR BLD: 28.5 % (ref 18–48)
MCH RBC QN AUTO: 33.2 PG (ref 27–31)
MCHC RBC AUTO-ENTMCNC: 34 G/DL (ref 32–36)
MCV RBC AUTO: 98 FL (ref 82–98)
MONOCYTES # BLD AUTO: 0.7 K/UL (ref 0.3–1)
MONOCYTES NFR BLD: 8.1 % (ref 4–15)
NEUTROPHILS # BLD AUTO: 5.3 K/UL (ref 1.8–7.7)
NEUTROPHILS NFR BLD: 60.3 % (ref 38–73)
NRBC BLD-RTO: 0 /100 WBC
PLATELET # BLD AUTO: 236 K/UL (ref 150–350)
PMV BLD AUTO: 10.2 FL (ref 9.2–12.9)
RBC # BLD AUTO: 4.31 M/UL (ref 4–5.4)
WBC # BLD AUTO: 8.78 K/UL (ref 3.9–12.7)

## 2019-10-16 PROCEDURE — 85025 COMPLETE CBC W/AUTO DIFF WBC: CPT

## 2019-10-16 PROCEDURE — 25000003 PHARM REV CODE 250: Performed by: FAMILY MEDICINE

## 2019-10-16 PROCEDURE — 73140 X-RAY EXAM OF FINGER(S): CPT | Mod: 26,LT,, | Performed by: RADIOLOGY

## 2019-10-16 PROCEDURE — 99284 EMERGENCY DEPT VISIT MOD MDM: CPT | Mod: 25

## 2019-10-16 PROCEDURE — 73140 XR FINGER 2 OR MORE VIEWS LEFT: ICD-10-PCS | Mod: 26,LT,, | Performed by: RADIOLOGY

## 2019-10-16 PROCEDURE — 73140 X-RAY EXAM OF FINGER(S): CPT | Mod: TC,FY,LT

## 2019-10-16 RX ORDER — SULFAMETHOXAZOLE AND TRIMETHOPRIM 800; 160 MG/1; MG/1
1 TABLET ORAL 2 TIMES DAILY
Status: DISCONTINUED | OUTPATIENT
Start: 2019-10-16 | End: 2019-10-16 | Stop reason: HOSPADM

## 2019-10-16 RX ORDER — HYDROCODONE BITARTRATE AND ACETAMINOPHEN 5; 325 MG/1; MG/1
1 TABLET ORAL
Status: COMPLETED | OUTPATIENT
Start: 2019-10-16 | End: 2019-10-16

## 2019-10-16 RX ORDER — SULFAMETHOXAZOLE AND TRIMETHOPRIM 800; 160 MG/1; MG/1
1 TABLET ORAL 2 TIMES DAILY
Qty: 14 TABLET | Refills: 0 | Status: SHIPPED | OUTPATIENT
Start: 2019-10-16 | End: 2019-10-23

## 2019-10-16 RX ORDER — SULFAMETHOXAZOLE AND TRIMETHOPRIM 800; 160 MG/1; MG/1
TABLET ORAL
Status: DISCONTINUED
Start: 2019-10-16 | End: 2019-10-16 | Stop reason: HOSPADM

## 2019-10-16 RX ORDER — SULFAMETHOXAZOLE AND TRIMETHOPRIM 800; 160 MG/1; MG/1
1 TABLET ORAL 2 TIMES DAILY
Qty: 14 TABLET | Refills: 0 | Status: SHIPPED | OUTPATIENT
Start: 2019-10-16 | End: 2019-10-16 | Stop reason: SDUPTHER

## 2019-10-16 RX ADMIN — SULFAMETHOXAZOLE AND TRIMETHOPRIM 1 TABLET: 800; 160 TABLET ORAL at 02:10

## 2019-10-16 RX ADMIN — HYDROCODONE BITARTRATE AND ACETAMINOPHEN 1 TABLET: 5; 325 TABLET ORAL at 02:10

## 2019-10-16 NOTE — ED PROVIDER NOTES
Encounter Date: 10/16/2019       History     Chief Complaint   Patient presents with    Hand Pain     34-year-old female presents complaining of pain to her left 5th digit, and recently had an ORIF with a Rafael wire placed per Dr. Fairchild orthopedic surgeon patient is scheduled for an upcoming appointment but noticed some fluid escaped from the entry site of the K-wire, she denies any fever chills nausea vomiting increased swelling to the finger or trauma to the site        Review of patient's allergies indicates:  No Known Allergies  Past Medical History:   Diagnosis Date    Anxiety     Depression      Past Surgical History:   Procedure Laterality Date    APPENDECTOMY      BREAST CYST EXCISION Left     DILATION AND CURETTAGE OF UTERUS      HYSTERECTOMY      OPEN REDUCTION AND INTERNAL FIXATION (ORIF) OF INJURY OF FINGER Left 9/26/2019    Procedure: ORIF, FINGER;  Surgeon: Lucian Fairchild DO;  Location: [facility] OR;  Service: Orthopedics;  Laterality: Left;  Equipment: [employer] Mini Frag Set/ Hand Set  Vendor/Rep: [employer]  C-Arm: Entire  REQUIRES FIRST ASSISTANT ITZEL       History reviewed. No pertinent family history.  Social History     Tobacco Use    Smoking status: Current Every Day Smoker     Packs/day: 1.00     Years: 15.00     Pack years: 15.00     Types: Cigarettes   Substance Use Topics    Alcohol use: Yes     Comment: occ    Drug use: Never     Review of Systems   Constitutional: Negative for fever.   HENT: Negative for sore throat.    Respiratory: Negative for shortness of breath.    Cardiovascular: Negative for chest pain.   Gastrointestinal: Negative for nausea.   Genitourinary: Negative for dysuria.   Musculoskeletal: Negative for back pain.   Skin: Negative for rash.   Neurological: Negative for weakness.   Hematological: Does not bruise/bleed easily.       Physical Exam     Initial Vitals [10/16/19 0150]   BP Pulse Resp Temp SpO2   (!) 142/91 96 16 98.2 °F (36.8 °C) 100 %      MAP        --         Physical Exam    Nursing note and vitals reviewed.  Constitutional: She appears well-developed and well-nourished. She is not diaphoretic. No distress.   HENT:   Head: Normocephalic and atraumatic.   Right Ear: External ear normal.   Left Ear: External ear normal.   Eyes: Pupils are equal, round, and reactive to light. Right eye exhibits no discharge. Left eye exhibits no discharge.   Neck: No tracheal deviation present. No JVD present.   Cardiovascular: Exam reveals no friction rub.    No murmur heard.  Pulmonary/Chest: No stridor. No respiratory distress. She has no wheezes. She has no rales.   Abdominal: Bowel sounds are normal. She exhibits no distension.   Musculoskeletal: Normal range of motion.   Orthopedic hardware is in place splint is applied gentle manipulation finger does not yield any discharge there is no particular erythema at the entry site   Neurological: She is alert.   Skin: Skin is warm.   Psychiatric: She has a normal mood and affect.         ED Course   Procedures  Labs Reviewed   CBC W/ AUTO DIFFERENTIAL - Abnormal; Notable for the following components:       Result Value    Mean Corpuscular Hemoglobin 33.2 (*)     All other components within normal limits          Imaging Results          X-Ray Finger 2 or More Views Left (In process)                                       Clinical Impression:       ICD-10-CM ICD-9-CM   1. Pain of finger of left hand M79.645 729.5                                Hunter Neil MD  10/16/19 0540

## 2019-10-25 ENCOUNTER — PATIENT MESSAGE (OUTPATIENT)
Dept: ORTHOPEDICS | Facility: CLINIC | Age: 34
End: 2019-10-25

## 2019-10-28 ENCOUNTER — HOSPITAL ENCOUNTER (EMERGENCY)
Facility: HOSPITAL | Age: 34
Discharge: ANOTHER HOSPITAL | End: 2019-10-28
Attending: FAMILY MEDICINE
Payer: COMMERCIAL

## 2019-10-28 VITALS
DIASTOLIC BLOOD PRESSURE: 71 MMHG | RESPIRATION RATE: 16 BRPM | HEIGHT: 66 IN | OXYGEN SATURATION: 98 % | SYSTOLIC BLOOD PRESSURE: 113 MMHG | TEMPERATURE: 98 F | BODY MASS INDEX: 24.11 KG/M2 | WEIGHT: 150 LBS | HEART RATE: 76 BPM

## 2019-10-28 DIAGNOSIS — I96 GANGRENE OF FINGER: Primary | ICD-10-CM

## 2019-10-28 PROBLEM — S62.637D CLOSED DISPLACED FRACTURE OF DISTAL PHALANX OF LEFT LITTLE FINGER WITH ROUTINE HEALING: Status: ACTIVE | Noted: 2019-10-28

## 2019-10-28 LAB
ALBUMIN SERPL BCP-MCNC: 4.9 G/DL (ref 3.5–5.2)
ALP SERPL-CCNC: 51 U/L (ref 55–135)
ALT SERPL W/O P-5'-P-CCNC: 16 U/L (ref 10–44)
ANION GAP SERPL CALC-SCNC: 11 MMOL/L (ref 8–16)
AST SERPL-CCNC: 19 U/L (ref 10–40)
BASOPHILS # BLD AUTO: 0.06 K/UL (ref 0–0.2)
BASOPHILS NFR BLD: 0.8 % (ref 0–1.9)
BILIRUB SERPL-MCNC: 0.9 MG/DL (ref 0.1–1)
BUN SERPL-MCNC: 10 MG/DL (ref 6–20)
CALCIUM SERPL-MCNC: 9.4 MG/DL (ref 8.7–10.5)
CHLORIDE SERPL-SCNC: 105 MMOL/L (ref 95–110)
CO2 SERPL-SCNC: 22 MMOL/L (ref 23–29)
CREAT SERPL-MCNC: 0.9 MG/DL (ref 0.5–1.4)
DIFFERENTIAL METHOD: ABNORMAL
EOSINOPHIL # BLD AUTO: 0.3 K/UL (ref 0–0.5)
EOSINOPHIL NFR BLD: 3.3 % (ref 0–8)
ERYTHROCYTE [DISTWIDTH] IN BLOOD BY AUTOMATED COUNT: 12.3 % (ref 11.5–14.5)
EST. GFR  (AFRICAN AMERICAN): >60 ML/MIN/1.73 M^2
EST. GFR  (NON AFRICAN AMERICAN): >60 ML/MIN/1.73 M^2
GLUCOSE SERPL-MCNC: 90 MG/DL (ref 70–110)
HCT VFR BLD AUTO: 43.6 % (ref 37–48.5)
HGB BLD-MCNC: 14.9 G/DL (ref 12–16)
IMM GRANULOCYTES # BLD AUTO: 0.01 K/UL (ref 0–0.04)
IMM GRANULOCYTES NFR BLD AUTO: 0.1 % (ref 0–0.5)
LYMPHOCYTES # BLD AUTO: 2 K/UL (ref 1–4.8)
LYMPHOCYTES NFR BLD: 26.2 % (ref 18–48)
MCH RBC QN AUTO: 32.7 PG (ref 27–31)
MCHC RBC AUTO-ENTMCNC: 34.2 G/DL (ref 32–36)
MCV RBC AUTO: 96 FL (ref 82–98)
MONOCYTES # BLD AUTO: 0.5 K/UL (ref 0.3–1)
MONOCYTES NFR BLD: 6.5 % (ref 4–15)
NEUTROPHILS # BLD AUTO: 4.8 K/UL (ref 1.8–7.7)
NEUTROPHILS NFR BLD: 63.1 % (ref 38–73)
NRBC BLD-RTO: 0 /100 WBC
PLATELET # BLD AUTO: 210 K/UL (ref 150–350)
PMV BLD AUTO: 10.4 FL (ref 9.2–12.9)
POTASSIUM SERPL-SCNC: 3.8 MMOL/L (ref 3.5–5.1)
PROT SERPL-MCNC: 8 G/DL (ref 6–8.4)
RBC # BLD AUTO: 4.55 M/UL (ref 4–5.4)
SODIUM SERPL-SCNC: 138 MMOL/L (ref 136–145)
WBC # BLD AUTO: 7.55 K/UL (ref 3.9–12.7)

## 2019-10-28 PROCEDURE — 73140 X-RAY EXAM OF FINGER(S): CPT | Mod: TC,FY,RT

## 2019-10-28 PROCEDURE — 99285 EMERGENCY DEPT VISIT HI MDM: CPT | Mod: 25

## 2019-10-28 PROCEDURE — 80053 COMPREHEN METABOLIC PANEL: CPT

## 2019-10-28 PROCEDURE — 36415 COLL VENOUS BLD VENIPUNCTURE: CPT

## 2019-10-28 PROCEDURE — 73140 X-RAY EXAM OF FINGER(S): CPT | Mod: 26,RT,, | Performed by: RADIOLOGY

## 2019-10-28 PROCEDURE — 85025 COMPLETE CBC W/AUTO DIFF WBC: CPT

## 2019-10-28 PROCEDURE — 73140 XR FINGER 2 OR MORE VIEWS RIGHT: ICD-10-PCS | Mod: 26,RT,, | Performed by: RADIOLOGY

## 2019-10-28 PROCEDURE — 36000 PLACE NEEDLE IN VEIN: CPT

## 2019-10-28 NOTE — ED TRIAGE NOTES
Surgery of left #5 finger to repair comminuted fx of phalanx.  Continues with K-wire in tip with black areas of tip of finger noted, along with bruising under nail bed.  Ortho MD out of town, staff told pt to have doctor in ED evaluate for neucrosis.

## 2019-10-28 NOTE — ED PROVIDER NOTES
Encounter Date: 10/28/2019       History     Chief Complaint   Patient presents with    tip of left #5 finger black     34-year-old female presents complaining of pain and discoloration to the left 5th digit, patient had ORIF to the left 5th digit per  on September 26, 2019, she was seen here in the ED on October 16 by myself and which time is complaining of some pain to the area but no evidence of jessi cellulitis, patient was placed on Bactrim p.o. at that time which she finished, she has not seen her surgeon in over 3 weeks, currently her surgeon is not available as he is out of town, the surgeon's nurse was contacted today        Review of patient's allergies indicates:  No Known Allergies  Past Medical History:   Diagnosis Date    Anxiety     Depression      Past Surgical History:   Procedure Laterality Date    APPENDECTOMY      BREAST CYST EXCISION Left     DILATION AND CURETTAGE OF UTERUS      HYSTERECTOMY      OPEN REDUCTION AND INTERNAL FIXATION (ORIF) OF INJURY OF FINGER Left 9/26/2019    Procedure: ORIF, FINGER;  Surgeon: Lucian Fairchild DO;  Location: [facility] OR;  Service: Orthopedics;  Laterality: Left;  Equipment: [employer] Mini Frag Set/ Hand Set  Vendor/Rep: [employer]  C-Arm: [employer]  REQUIRES FIRST ASSISTANT ITZEL       History reviewed. No pertinent family history.  Social History     Tobacco Use    Smoking status: Current Every Day Smoker     Packs/day: 1.00     Years: 15.00     Pack years: 15.00     Types: Cigarettes   Substance Use Topics    Alcohol use: Yes     Comment: occ    Drug use: Never     Review of Systems   Constitutional: Negative for fever.   HENT: Negative for sore throat.    Respiratory: Negative for shortness of breath.    Cardiovascular: Negative for chest pain.   Gastrointestinal: Negative for nausea.   Genitourinary: Negative for dysuria.   Musculoskeletal: Negative for back pain.        Positive pain to the left 5th finger tip   Skin: Negative for rash.    Neurological: Negative for weakness.   Hematological: Does not bruise/bleed easily.       Physical Exam     Initial Vitals [10/28/19 1030]   BP Pulse Resp Temp SpO2   121/72 76 16 97.9 °F (36.6 °C) 98 %      MAP       --         Physical Exam    Nursing note and vitals reviewed.  Constitutional: She appears well-developed and well-nourished. She is not diaphoretic. No distress.   HENT:   Head: Normocephalic and atraumatic.   Right Ear: External ear normal.   Left Ear: External ear normal.   Eyes: Pupils are equal, round, and reactive to light. Right eye exhibits no discharge. Left eye exhibits no discharge.   Neck: No tracheal deviation present. No JVD present.   Cardiovascular: Exam reveals no friction rub.    No murmur heard.  Pulmonary/Chest: No stridor. No respiratory distress. She has no wheezes. She has no rales.   Abdominal: Bowel sounds are normal. She exhibits no distension.   Musculoskeletal: Normal range of motion. She exhibits tenderness.   Left 5th finger has well demarcated gangrenous area in the distal aspect and under the nail, the K wire is still in place there is no distinct discharge or fluctuance, there is some deficit to light touch in the distal finger tip   Neurological: She is alert.   Skin: Skin is warm.   Psychiatric: She has a normal mood and affect.         ED Course   Procedures  Labs Reviewed   CBC W/ AUTO DIFFERENTIAL - Abnormal; Notable for the following components:       Result Value    Mean Corpuscular Hemoglobin 32.7 (*)     All other components within normal limits   COMPREHENSIVE METABOLIC PANEL - Abnormal; Notable for the following components:    CO2 22 (*)     Alkaline Phosphatase 51 (*)     All other components within normal limits          Imaging Results          X-Ray Finger 2 or More Views Right (Final result)  Result time 10/28/19 11:36:06    Final result by Maury Balbuena MD (10/28/19 11:36:06)                 Impression:      As above.      Electronically signed  by: Maury Balbuena  Date:    10/28/2019  Time:    11:36             Narrative:    EXAMINATION:  XR FINGER 2 OR MORE VIEWS RIGHT    CLINICAL HISTORY:  pain;    TECHNIQUE:  Three views of the right fingers were obtained.    COMPARISON:  None    FINDINGS:  Postoperative changes of external fixation of the distal 5th digit with surgical pin traversing a nondisplaced fracture of the distal phalanx again demonstrated.  No evidence of acute hardware complication or significant interval detrimental change in the appearance of the 5th digit as compared to 10/16/2019.  There is continued soft tissue swelling about the 5th digit.  No new fracture or dislocation.  No obvious osseous erosive changes.  Cartilage spaces are well maintained.                                 Medical Decision Making:   ED Management:  This patient will need orthopedic evaluation and will be transferred for this as we currently have no orthopedic coverage                   ED Course as of Oct 28 1620   Mon Oct 28, 2019   1318 [facility] has arranged for transfer accepted at [facility],    [WK]      ED Course User Index  [WK] Hunter Neil MD     Clinical Impression:       ICD-10-CM ICD-9-CM   1. Gangrene of finger I96 785.4                                Hunter Neil MD  10/28/19 1301       Hunter Neil MD  10/28/19 1620

## 2019-10-29 ENCOUNTER — PATIENT MESSAGE (OUTPATIENT)
Dept: ORTHOPEDICS | Facility: CLINIC | Age: 34
End: 2019-10-29

## 2019-10-30 ENCOUNTER — OFFICE VISIT (OUTPATIENT)
Dept: ORTHOPEDICS | Facility: CLINIC | Age: 34
End: 2019-10-30
Payer: COMMERCIAL

## 2019-10-30 ENCOUNTER — HOSPITAL ENCOUNTER (OUTPATIENT)
Dept: RADIOLOGY | Facility: HOSPITAL | Age: 34
Discharge: HOME | End: 2019-10-30
Attending: ORTHOPAEDIC SURGERY
Payer: COMMERCIAL

## 2019-10-30 VITALS — BODY MASS INDEX: 24.1 KG/M2 | WEIGHT: 149.94 LBS | RESPIRATION RATE: 18 BRPM | HEIGHT: 66 IN

## 2019-10-30 DIAGNOSIS — M79.642 LEFT HAND PAIN: ICD-10-CM

## 2019-10-30 DIAGNOSIS — Z48.89 ENCOUNTER FOR POST SURGICAL WOUND CHECK: Primary | ICD-10-CM

## 2019-10-30 PROCEDURE — 99024 POSTOP FOLLOW-UP VISIT: CPT | Mod: S$GLB,,, | Performed by: ORTHOPAEDIC SURGERY

## 2019-10-30 PROCEDURE — 99024 PR POST-OP FOLLOW-UP VISIT: ICD-10-PCS | Mod: S$GLB,,, | Performed by: ORTHOPAEDIC SURGERY

## 2019-10-30 PROCEDURE — 73130 X-RAY EXAM OF HAND: CPT | Mod: TC,PN,LT

## 2019-10-30 PROCEDURE — 99999 PR PBB SHADOW E&M-EST. PATIENT-LVL III: CPT | Mod: PBBFAC,,, | Performed by: ORTHOPAEDIC SURGERY

## 2019-10-30 PROCEDURE — 73130 X-RAY EXAM OF HAND: CPT | Mod: 26,LT,, | Performed by: RADIOLOGY

## 2019-10-30 PROCEDURE — 99999 PR PBB SHADOW E&M-EST. PATIENT-LVL III: ICD-10-PCS | Mod: PBBFAC,,, | Performed by: ORTHOPAEDIC SURGERY

## 2019-10-30 PROCEDURE — 73130 XR HAND COMPLETE 3 VIEW LEFT: ICD-10-PCS | Mod: 26,LT,, | Performed by: RADIOLOGY

## 2019-10-31 NOTE — PROGRESS NOTES
Subjective:      Patient ID: Anna Brock is a 34 y.o. female.    Chief Complaint: Post-op Evaluation of the Left Hand      HPI: Ms. Brock returns today for follow-up and wound check of her left small finger.  She had an open reduction and internal fixation of a comminuted intra-articular distal phalanx fracture on 09/26/2019.  On Monday while cleansing her finger she noticed darkened eschar and became concerned so she presented to the emergency room. She was forwarded from the emergency room to another location where a different orthopedic surgeon unsure her that it was superficial.    ROS:  No new diagnosis/surgery/prescriptions since last office visit on 10/07/2019.      Objective:      Physical Exam:   General: AAOx3.  No acute distress  Vascular:  Pulses intact and equal bilaterally.  Capillary refill less than 3 seconds and equal bilaterally  Neurologic:  Pinprick and soft touch intact and equal bilaterally  Integment:  Eschar tip left small finger.  Small ulcer volar aspect at DIP left small finger.  Incisions well approximated and healing.  Extremity:  Finger:  Pin protrudes from the tip of the left small finger.  Most skin pink tip left small finger.  Small area of flap necrosis volar aspect very tip left small finger.  No motion at DIP left small finger due to internal splint.  No purulence.  Radiography:  No x-ray done today.      Assessment:       Impression:  Wound check left small finger.      Plan:       1.  Discussed physical examination with the patient. Anna understands that she has some minor eschar at the tip of her finger but appears to be doing well.  2.  Debride eschar left small finger.  3.  Sterile dressing.  The patient was also shown how to place the dressing and supplies were given she understands she is to do a dressing change every day and cleanse her finger with warm soapy water once a day.  She is also to clean around her pin with hydrogen peroxide solution daily.  4.  Any pain  can be treated with over-the-counter medications dosed per box instructions.  5.  One-handed activities with the right hand only.  6.  [facility] portal was discussed with the patient and information was given.  The patient was encouraged to use the portal for future encounters.  7.  Follow up in 1 week for wound check

## 2019-11-02 ENCOUNTER — PATIENT MESSAGE (OUTPATIENT)
Dept: ORTHOPEDICS | Facility: CLINIC | Age: 34
End: 2019-11-02

## 2019-11-04 ENCOUNTER — PATIENT MESSAGE (OUTPATIENT)
Dept: ORTHOPEDICS | Facility: CLINIC | Age: 34
End: 2019-11-04

## 2019-11-06 ENCOUNTER — OFFICE VISIT (OUTPATIENT)
Dept: ORTHOPEDICS | Facility: CLINIC | Age: 34
End: 2019-11-06
Payer: COMMERCIAL

## 2019-11-06 VITALS
SYSTOLIC BLOOD PRESSURE: 115 MMHG | DIASTOLIC BLOOD PRESSURE: 74 MMHG | HEART RATE: 79 BPM | BODY MASS INDEX: 24.1 KG/M2 | HEIGHT: 66 IN | WEIGHT: 149.94 LBS

## 2019-11-06 DIAGNOSIS — Z48.89 ENCOUNTER FOR POST SURGICAL WOUND CHECK: Primary | ICD-10-CM

## 2019-11-06 PROCEDURE — 99024 PR POST-OP FOLLOW-UP VISIT: ICD-10-PCS | Mod: S$GLB,,, | Performed by: ORTHOPAEDIC SURGERY

## 2019-11-06 PROCEDURE — 99999 PR PBB SHADOW E&M-EST. PATIENT-LVL III: CPT | Mod: PBBFAC,,, | Performed by: ORTHOPAEDIC SURGERY

## 2019-11-06 PROCEDURE — 99999 PR PBB SHADOW E&M-EST. PATIENT-LVL III: ICD-10-PCS | Mod: PBBFAC,,, | Performed by: ORTHOPAEDIC SURGERY

## 2019-11-06 PROCEDURE — 99024 POSTOP FOLLOW-UP VISIT: CPT | Mod: S$GLB,,, | Performed by: ORTHOPAEDIC SURGERY

## 2019-11-06 NOTE — PROGRESS NOTES
Subjective:      Patient ID: Anna Brock is a 34 y.o. female.    Chief Complaint: Post-op Evaluation (left pinky)      HPI: Ms. Brock returns today for wound check of her left small finger tip.  At her last visit she had some necrosis of the volar scan which was debrided.  She was shown how to dressing cleanse her finger she has been cleaning her finger with dish to turn urgent and putting bacitracin over all of her finger causing macerations of the cookie-incisional skin. She denied fever or chills.  She had an ORIF of a distal phalanx comminuted intra-articular fracture on 09/26/2019.  She is now approximately 5 weeks post op.    ROS:  No new diagnosis/surgery/prescriptions since last office visit on 10/30/2019.      Objective:      Physical Exam:   General: AAOx3.  No acute distress  Vascular:  Pulses intact and equal bilaterally.  Capillary refill less than 3 seconds and equal bilaterally  Neurologic:  Pinprick and soft touch intact and equal bilaterally  Integment:  Nail plate loose and easily removed. Granulation tissue volar aspect tip left small finger.  Necrosis skin tip left small finger.  Extremity:  Finger:  Pin protrudes from the tip of the left small finger.  No motion at PIP due to internal splint left small finger.  No swelling.  No erythema.  No purulence.  Radiography:  No x-ray done today.      Assessment:       Impression:   1.  Wound check volar tip left small finger.  2.  ORIF distal phalanx left small finger      Plan:       1.  Discussed physical examination with the patient. Anna understands that she has necrosed some was skin volar aspect of her finger which hopefully will heal with secondary intention but she understands she may need a cross-finger flap to cover her finger soft tissue defect.  Also discussed with the patient that is not advantageous for her to be cleansing her finger with detergent and she should be using something such as DIAL soap instead.  She also understands she  should continue to cleanse around the tip of the pin with hydrogen peroxide/sterile water solution.  2.  Debride, cleanse, and re-dress tip left small finger.  It was instructed to the patient how to cleanse and dress her finger and supplies were given. She understands she is only to put the bacitracin ointment into the open wound and no where else on her finger.  3.  Any pain can be treated with over-the-counter medications dosed per box instructions.  4.  Continue with one-handed activities with the right hand only.  5.  [facility] portal was discussed with the patient and information was given.  6.  Follow up in 1 week for re-evaluation.  If the patient has not resolved the granulation area better may consider scheduling her for a cross-finger flap at that time.

## 2019-11-15 ENCOUNTER — OFFICE VISIT (OUTPATIENT)
Dept: ORTHOPEDICS | Facility: CLINIC | Age: 34
End: 2019-11-15
Payer: COMMERCIAL

## 2019-11-15 VITALS
WEIGHT: 149.94 LBS | RESPIRATION RATE: 18 BRPM | DIASTOLIC BLOOD PRESSURE: 86 MMHG | BODY MASS INDEX: 24.1 KG/M2 | SYSTOLIC BLOOD PRESSURE: 132 MMHG | HEIGHT: 66 IN | HEART RATE: 76 BPM

## 2019-11-15 DIAGNOSIS — Z48.89 ENCOUNTER FOR POST SURGICAL WOUND CHECK: Primary | ICD-10-CM

## 2019-11-15 PROCEDURE — 99999 PR PBB SHADOW E&M-EST. PATIENT-LVL III: ICD-10-PCS | Mod: PBBFAC,,, | Performed by: ORTHOPAEDIC SURGERY

## 2019-11-15 PROCEDURE — 99999 PR PBB SHADOW E&M-EST. PATIENT-LVL III: CPT | Mod: PBBFAC,,, | Performed by: ORTHOPAEDIC SURGERY

## 2019-11-15 PROCEDURE — 99024 POSTOP FOLLOW-UP VISIT: CPT | Mod: S$GLB,,, | Performed by: ORTHOPAEDIC SURGERY

## 2019-11-15 PROCEDURE — 99024 PR POST-OP FOLLOW-UP VISIT: ICD-10-PCS | Mod: S$GLB,,, | Performed by: ORTHOPAEDIC SURGERY

## 2019-11-16 NOTE — PROGRESS NOTES
Subjective:      Patient ID: Anna Brock is a 34 y.o. female.    Chief Complaint: Follow-up of the Left Hand      HPI: Ms. Brock returns today for re-evaluation of the tip of her left small finger.  She has been having serial wound checks.  She has been doing her daily dressing changes as discussed at her last visit on 11/06/2019 and has noticed that the dehiscence has improved.  She originally had an ORIF of the distal phalanx of her left small finger on 09/26/2019.  She is now approximately 6-7 weeks postop.    ROS:  No new diagnosis/surgery/prescriptions since last office visit on 11/06/2019.      Objective:      Physical Exam:   General: AAOx3.  No acute distress  Vascular:  Pulses intact and equal bilaterally.  Capillary refill less than 3 seconds and equal bilaterally  Neurologic:  Pinprick and soft touch blunted tip left small finger.  Integment:  Volar dehiscence tip left small finger no erythema.  No purulence.  Extremity:  Hand:  Left small finger PIP flexion extension decreased.  No flexion or extension at DIP consistent with internal splint.  No swelling. Pronation/supination both hands equal 90/85 degrees. Dorsiflexion/volar flexion both hands equal 80/75 degrees. Pin protrudes tip left small finger  Radiography:  No new x-rays done today.      Assessment:       Impression:  Wound check left small finger.      Plan:       1.  Discussed physical examination with the patient. Anna understands that she is improving.  And with further treatment she should continue to improve if she does not she could consider a cross-finger flap at that time.  2.  Cleanse and re-dress tip left small finger.  3.  Continue with daily dressing changes as previously shown.  4.  Continue with pin care.  5.  Start ROM exercises at MCP and PIP left small finger these were shown to the patient. May referred to occupational therapy at next visit if she is not gaining motion.  6.  [employer] portal was discussed with the patient  and information was given.  The patient was encouraged to use the portal for future encounters.  7.  Follow up in approximately 10 days with an x-ray of the left hand may consider pulling the pin at that visit.

## 2019-11-19 DIAGNOSIS — M79.642 LEFT HAND PAIN: Primary | ICD-10-CM

## 2019-11-25 ENCOUNTER — OFFICE VISIT (OUTPATIENT)
Dept: ORTHOPEDICS | Facility: CLINIC | Age: 34
End: 2019-11-25
Payer: COMMERCIAL

## 2019-11-25 ENCOUNTER — HOSPITAL ENCOUNTER (OUTPATIENT)
Dept: RADIOLOGY | Facility: HOSPITAL | Age: 34
Discharge: HOME | End: 2019-11-25
Attending: ORTHOPAEDIC SURGERY
Payer: COMMERCIAL

## 2019-11-25 VITALS
BODY MASS INDEX: 23.95 KG/M2 | WEIGHT: 149 LBS | SYSTOLIC BLOOD PRESSURE: 142 MMHG | HEIGHT: 66 IN | HEART RATE: 80 BPM | DIASTOLIC BLOOD PRESSURE: 73 MMHG

## 2019-11-25 DIAGNOSIS — M79.642 LEFT HAND PAIN: ICD-10-CM

## 2019-11-25 DIAGNOSIS — S62.637D DISPLACED FRACTURE OF DISTAL PHALANX OF LEFT LITTLE FINGER, SUBSEQUENT ENCOUNTER FOR FRACTURE WITH ROUTINE HEALING: Primary | ICD-10-CM

## 2019-11-25 DIAGNOSIS — M79.642 LEFT HAND PAIN: Primary | ICD-10-CM

## 2019-11-25 PROCEDURE — 99999 PR PBB SHADOW E&M-EST. PATIENT-LVL III: ICD-10-PCS | Mod: PBBFAC,,, | Performed by: ORTHOPAEDIC SURGERY

## 2019-11-25 PROCEDURE — 73130 XR HAND COMPLETE 3 VIEW LEFT: ICD-10-PCS | Mod: 26,LT,, | Performed by: RADIOLOGY

## 2019-11-25 PROCEDURE — 29130 PR APPLY FINGER SPLINT,STATIC: ICD-10-PCS | Mod: 58,F4,S$GLB, | Performed by: ORTHOPAEDIC SURGERY

## 2019-11-25 PROCEDURE — 99024 PR POST-OP FOLLOW-UP VISIT: ICD-10-PCS | Mod: S$GLB,,, | Performed by: ORTHOPAEDIC SURGERY

## 2019-11-25 PROCEDURE — 99024 POSTOP FOLLOW-UP VISIT: CPT | Mod: S$GLB,,, | Performed by: ORTHOPAEDIC SURGERY

## 2019-11-25 PROCEDURE — 99999 PR PBB SHADOW E&M-EST. PATIENT-LVL III: CPT | Mod: PBBFAC,,, | Performed by: ORTHOPAEDIC SURGERY

## 2019-11-25 PROCEDURE — 29130 APPL FINGER SPLINT STATIC: CPT | Mod: 58,F4,S$GLB, | Performed by: ORTHOPAEDIC SURGERY

## 2019-11-25 PROCEDURE — 73130 X-RAY EXAM OF HAND: CPT | Mod: TC,FY,LT

## 2019-11-25 PROCEDURE — 73130 X-RAY EXAM OF HAND: CPT | Mod: 26,LT,, | Performed by: RADIOLOGY

## 2019-11-26 NOTE — PROCEDURES
Procedures   Explantation internal splint DIP left small finger         Patient since left small finger was identified and a pin protruded from the small finger this was cleansed extensively with Betadine solution. After extensively cleansing the pin was grasped with a pair of stout hemostats and the pin was rotated back and forth while applying longitudinal traction.  The pin was then removed in toto.  The patient's pin hole was then dressed with a sterile bandage and she was placed in a Stack splint.  The patient tolerated the procedure well without complication.

## 2019-11-26 NOTE — PROGRESS NOTES
Subjective:      Patient ID: Anna Brock is a 34 y.o. female.    Chief Complaint: Hand Injury (left hand/pinky ORIF 9/26/19)      HPI: Ms. Brock returns today for follow-up on open reduction internal fixation distal phalanx left small finger.  She has been doing daily dressing changes and wound care of which the tip of her finger appears to be resolving.  She feels she is doing much better today. She had an ORIF of the distal phalanx of the left small finger on 09/26/2019.  She is now approximately 2 months postop.    ROS:  No new diagnosis/surgery/prescriptions since last office visit on 11/15/2019.      Objective:      Physical Exam:   General: AAOx3.  No acute distress  Vascular:  Pulses intact and equal bilaterally.  Capillary refill less than 3 seconds and equal bilaterally  Neurologic:  Pinprick and soft touch intact and equal bilaterally  Integment:  Volar wound opening mostly closed, eschar tip left small finger.  Extremity:  Hand:  Left small finger PIP flexion extension decreased.  No flexion or extension at DIP consistent with internal splint.  No swelling. Pronation/supination both hands equal 90/85 degrees. Dorsiflexion/volar flexion both hands equal 80/75 degrees. Pin protrudes tip left small finger  Radiography:  Personally reviewed x-rays of the left hand completed on 11/25/2019 showed healing of a near anatomic aligned comminuted distal phalanx fracture of the small finger with a pin traversing the DIP        Assessment:       Impression:  ORIF distal phalanx, left small finger.      Plan:       1.  Discussed physical examination and radiographic findings with the patient. Anna understands that she is doing well and healing well.  2.  Pull pin today.  3.  Place in a stack splint.  4.  Continue with wound care to the tip of the left small finger.  5.  Refer to occupational therapy to start motion exercises.  6.  Gentle motion exercises to the patient's small finger at home these were shown  discussed with the patient.  7.  Follow up in 1 month with x-ray of the left hand

## 2019-12-17 DIAGNOSIS — M79.642 LEFT HAND PAIN: Primary | ICD-10-CM

## 2019-12-20 ENCOUNTER — CLINICAL SUPPORT (OUTPATIENT)
Dept: REHABILITATION | Facility: HOSPITAL | Age: 34
End: 2019-12-20
Payer: COMMERCIAL

## 2019-12-20 DIAGNOSIS — S62.637D: Primary | ICD-10-CM

## 2019-12-20 PROCEDURE — 97167 OT EVAL HIGH COMPLEX 60 MIN: CPT

## 2019-12-20 PROCEDURE — 97110 THERAPEUTIC EXERCISES: CPT
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
## 2019-12-23 ENCOUNTER — HOSPITAL ENCOUNTER (OUTPATIENT)
Dept: RADIOLOGY | Facility: HOSPITAL | Age: 34
Discharge: HOME | End: 2019-12-23
Attending: ORTHOPAEDIC SURGERY
Payer: COMMERCIAL

## 2019-12-23 ENCOUNTER — OFFICE VISIT (OUTPATIENT)
Dept: ORTHOPEDICS | Facility: CLINIC | Age: 34
End: 2019-12-23
Payer: COMMERCIAL

## 2019-12-23 VITALS
DIASTOLIC BLOOD PRESSURE: 72 MMHG | WEIGHT: 149 LBS | HEART RATE: 66 BPM | BODY MASS INDEX: 23.95 KG/M2 | HEIGHT: 66 IN | SYSTOLIC BLOOD PRESSURE: 126 MMHG

## 2019-12-23 DIAGNOSIS — M79.642 LEFT HAND PAIN: ICD-10-CM

## 2019-12-23 DIAGNOSIS — Z48.89 ENCOUNTER FOR POST SURGICAL WOUND CHECK: Primary | ICD-10-CM

## 2019-12-23 DIAGNOSIS — S62.637D DISPLACED FRACTURE OF DISTAL PHALANX OF LEFT LITTLE FINGER, SUBSEQUENT ENCOUNTER FOR FRACTURE WITH ROUTINE HEALING: ICD-10-CM

## 2019-12-23 PROCEDURE — 73130 XR HAND COMPLETE 3 VIEW LEFT: ICD-10-PCS | Mod: 26,LT,, | Performed by: RADIOLOGY

## 2019-12-23 PROCEDURE — 99024 POSTOP FOLLOW-UP VISIT: CPT | Mod: S$GLB,,, | Performed by: ORTHOPAEDIC SURGERY

## 2019-12-23 PROCEDURE — 73130 X-RAY EXAM OF HAND: CPT | Mod: 26,LT,, | Performed by: RADIOLOGY

## 2019-12-23 PROCEDURE — 99999 PR PBB SHADOW E&M-EST. PATIENT-LVL III: ICD-10-PCS | Mod: PBBFAC,,, | Performed by: ORTHOPAEDIC SURGERY

## 2019-12-23 PROCEDURE — 99999 PR PBB SHADOW E&M-EST. PATIENT-LVL III: CPT | Mod: PBBFAC,,, | Performed by: ORTHOPAEDIC SURGERY

## 2019-12-23 PROCEDURE — 73130 X-RAY EXAM OF HAND: CPT | Mod: TC,FY,LT

## 2019-12-23 PROCEDURE — 99024 PR POST-OP FOLLOW-UP VISIT: ICD-10-PCS | Mod: S$GLB,,, | Performed by: ORTHOPAEDIC SURGERY

## 2019-12-23 NOTE — PROGRESS NOTES
Subjective:      Patient ID: Anna Brock is a 34 y.o. female.    Chief Complaint: Post-op Evaluation (s/p left pinky ORIF 9/26/19)      HPI: Ms. Brock returns today for follow-up on open reduction internal fixation of left small finger distal phalanx.  She stated she is doing well and did dehiscence area has near completely resolved she only has an area of eschar.  She stated that the eschar softens when she showers but she is afraid to remove it. Her date of surgery 09/26/2019.  She is now approximately 3 months postop.  She just started going to occupational therapy but stated that they are not pushing her finger she has some stiffness in her small finger, she denied pain.  Her internal splint was removed at her last visit.  She is now approximately 3 months postop for the ORIF.    ROS:  No new diagnosis/surgery/prescriptions since last office visit on 11/25/2019.  Constitution: Negative for chills and fever.   HENT: Negative for congestion.    Eyes: Negative for blurred vision.   Cardiovascular: Negative for chest pain.   Respiratory: Negative for cough.    Endocrine: Negative for polydipsia.   Hematologic/Lymphatic: Negative for adenopathy.   Skin: Negative for flushing and itching.   Musculoskeletal: Positive for joint pain and joint swelling. Negative for gout.   Gastrointestinal: Positive for heartburn. Negative for constipation and diarrhea.   Genitourinary: Negative for nocturia.   Neurological: Positive for headaches. Negative for seizures.   Psychiatric/Behavioral: Negative for depression. The patient is not nervous/anxious.    Allergic/Immunologic: Positive for environmental allergies.       Objective:      Physical Exam:   General: AAOx3.  No acute distress  Vascular:  Pulses intact and equal bilaterally.  Capillary refill less than 3 seconds and equal bilaterally  Neurologic:  Pinprick and soft touch intact and equal bilaterally  Integment:  No ecchymosis, no errythema.  Eschar volar aspect tip  left small finger.  With eschar removed good early skin present.  Extremity:  Hand:  Flexion/extension PIP left small finger 15/0 degrees, DIP left small finger 0/5 degrees. No swelling.  Nontender with palpation.  Relatively nontender with attempts at motion. With passive motion flexion/extension PIP 0/65°  Radiography:  Personally reviewed x-rays of the left hand completed on 12/23/2019 showed radius placement of a comminuted intra-articular fracture of the distal phalanx.      Assessment:       Impression:  Comminuted intra-articular distal phalanx fracture, left small finger, post ORIF.      Plan:       1.  Discussed physical examination and radiographic findings with the patient. Anna understands that she has redisplaced her fracture but it appears she is healing with a fibrous union.  Discussed in detail with the patient that she can be treated multiple ways she could continue with following her finger to see how she improves she understands she will most likely develop arthritis and arthrofibrosis of the DIP of her small finger she could also consider a fusion.  She stated she would prefer not to have a fusion at this time.  2.  Continue with Stack splint when participating in heavy activities and exercising.  3.  Home exercises to include finger flexion/extension exercises were shown and discussed. She understands she needs to be aggressive and do them several times a day.  4.  Continue with occupational therapy.  Discussed with the patient that she needs to notify her occupational therapist that they would need to be more aggressive.  5.  For any pain she can be treated with over-the-counter medications dosed per box instructions.  6.  [facility] portal was discussed with the patient and information was given.  The patient was encouraged to use the portal for future encounters.  7.  Follow up in approximately 6 weeks with an x-ray of the left hand hand.

## 2020-01-30 DIAGNOSIS — M79.642 LEFT HAND PAIN: Primary | ICD-10-CM

## 2020-02-05 ENCOUNTER — OFFICE VISIT (OUTPATIENT)
Dept: ORTHOPEDICS | Facility: CLINIC | Age: 35
End: 2020-02-05
Payer: COMMERCIAL

## 2020-02-05 ENCOUNTER — PATIENT MESSAGE (OUTPATIENT)
Dept: ORTHOPEDICS | Facility: CLINIC | Age: 35
End: 2020-02-05

## 2020-02-05 ENCOUNTER — HOSPITAL ENCOUNTER (OUTPATIENT)
Dept: RADIOLOGY | Facility: HOSPITAL | Age: 35
Discharge: HOME | End: 2020-02-05
Attending: ORTHOPAEDIC SURGERY
Payer: COMMERCIAL

## 2020-02-05 VITALS
HEIGHT: 66 IN | SYSTOLIC BLOOD PRESSURE: 120 MMHG | DIASTOLIC BLOOD PRESSURE: 71 MMHG | WEIGHT: 149 LBS | HEART RATE: 79 BPM | BODY MASS INDEX: 23.95 KG/M2

## 2020-02-05 DIAGNOSIS — S62.639K: Primary | ICD-10-CM

## 2020-02-05 DIAGNOSIS — M79.642 LEFT HAND PAIN: ICD-10-CM

## 2020-02-05 PROCEDURE — 73130 X-RAY EXAM OF HAND: CPT | Mod: TC,PN,LT

## 2020-02-05 PROCEDURE — 73130 XR HAND COMPLETE 3 VIEW LEFT: ICD-10-PCS | Mod: 26,LT,, | Performed by: RADIOLOGY

## 2020-02-05 PROCEDURE — 73130 X-RAY EXAM OF HAND: CPT | Mod: 26,LT,, | Performed by: RADIOLOGY

## 2020-02-05 PROCEDURE — 99999 PR PBB SHADOW E&M-EST. PATIENT-LVL III: CPT | Mod: PBBFAC,,, | Performed by: ORTHOPAEDIC SURGERY

## 2020-02-05 PROCEDURE — 99999 PR PBB SHADOW E&M-EST. PATIENT-LVL III: ICD-10-PCS | Mod: PBBFAC,,, | Performed by: ORTHOPAEDIC SURGERY

## 2020-02-05 PROCEDURE — 99213 OFFICE O/P EST LOW 20 MIN: CPT | Mod: S$GLB,,, | Performed by: ORTHOPAEDIC SURGERY

## 2020-02-05 PROCEDURE — 99213 PR OFFICE/OUTPT VISIT, EST, LEVL III, 20-29 MIN: ICD-10-PCS | Mod: S$GLB,,, | Performed by: ORTHOPAEDIC SURGERY
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
## 2020-02-12 ENCOUNTER — OFFICE VISIT (OUTPATIENT)
Dept: ORTHOPEDICS | Facility: CLINIC | Age: 35
End: 2020-02-12
Payer: COMMERCIAL

## 2020-02-12 VITALS
WEIGHT: 149.06 LBS | HEART RATE: 56 BPM | HEIGHT: 66 IN | DIASTOLIC BLOOD PRESSURE: 70 MMHG | SYSTOLIC BLOOD PRESSURE: 107 MMHG | BODY MASS INDEX: 23.95 KG/M2

## 2020-02-12 DIAGNOSIS — Z01.818 PRE-OP EXAM: Primary | ICD-10-CM

## 2020-02-12 DIAGNOSIS — I96 FINGER NECROSIS: ICD-10-CM

## 2020-02-12 DIAGNOSIS — S62.637K: ICD-10-CM

## 2020-02-12 DIAGNOSIS — M79.642 LEFT HAND PAIN: ICD-10-CM

## 2020-02-12 PROCEDURE — 99213 OFFICE O/P EST LOW 20 MIN: CPT | Mod: 57,S$GLB,, | Performed by: ORTHOPAEDIC SURGERY

## 2020-02-12 PROCEDURE — 99999 PR PBB SHADOW E&M-EST. PATIENT-LVL III: CPT | Mod: PBBFAC,,, | Performed by: ORTHOPAEDIC SURGERY

## 2020-02-12 PROCEDURE — 99999 PR PBB SHADOW E&M-EST. PATIENT-LVL III: ICD-10-PCS | Mod: PBBFAC,,, | Performed by: ORTHOPAEDIC SURGERY

## 2020-02-12 PROCEDURE — 99213 PR OFFICE/OUTPT VISIT, EST, LEVL III, 20-29 MIN: ICD-10-PCS | Mod: 57,S$GLB,, | Performed by: ORTHOPAEDIC SURGERY

## 2020-02-12 RX ORDER — SODIUM CHLORIDE 9 MG/ML
INJECTION, SOLUTION INTRAVENOUS CONTINUOUS
Status: CANCELLED | OUTPATIENT
Start: 2020-02-12

## 2020-02-12 RX ORDER — MUPIROCIN 20 MG/G
OINTMENT TOPICAL
Status: CANCELLED | OUTPATIENT
Start: 2020-02-12
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
## 2020-03-09 ENCOUNTER — HOSPITAL ENCOUNTER (OUTPATIENT)
Dept: PREADMISSION TESTING | Facility: HOSPITAL | Age: 35
Discharge: HOME | End: 2020-03-09
Attending: ORTHOPAEDIC SURGERY
Payer: COMMERCIAL

## 2020-03-10 ENCOUNTER — PATIENT MESSAGE (OUTPATIENT)
Dept: SURGERY | Facility: HOSPITAL | Age: 35
End: 2020-03-10

## 2020-03-11 ENCOUNTER — PATIENT MESSAGE (OUTPATIENT)
Dept: ORTHOPEDICS | Facility: CLINIC | Age: 35
End: 2020-03-11

## 2020-03-12 ENCOUNTER — ANESTHESIA EVENT (OUTPATIENT)
Dept: SURGERY | Facility: HOSPITAL | Age: 35
End: 2020-03-12
Payer: COMMERCIAL

## 2020-03-12 ENCOUNTER — HOSPITAL ENCOUNTER (OUTPATIENT)
Facility: HOSPITAL | Age: 35
Discharge: HOME | End: 2020-03-12
Attending: ORTHOPAEDIC SURGERY | Admitting: ORTHOPAEDIC SURGERY
Payer: COMMERCIAL

## 2020-03-12 ENCOUNTER — ANESTHESIA (OUTPATIENT)
Dept: SURGERY | Facility: HOSPITAL | Age: 35
End: 2020-03-12
Payer: COMMERCIAL

## 2020-03-12 DIAGNOSIS — I96 FINGER NECROSIS: Primary | ICD-10-CM

## 2020-03-12 DIAGNOSIS — Z01.818 PRE-OP EXAM: ICD-10-CM

## 2020-03-12 DIAGNOSIS — S62.637K: ICD-10-CM

## 2020-03-12 PROCEDURE — 63600175 PHARM REV CODE 636 W HCPCS: Performed by: NURSE ANESTHETIST, CERTIFIED REGISTERED

## 2020-03-12 PROCEDURE — 63600175 PHARM REV CODE 636 W HCPCS: Performed by: ORTHOPAEDIC SURGERY

## 2020-03-12 PROCEDURE — 88341 PR IHC OR ICC EACH ADD'L SINGLE ANTIBODY  STAINPR: ICD-10-PCS | Mod: 26,,, | Performed by: PATHOLOGY

## 2020-03-12 PROCEDURE — 71000015 HC POSTOP RECOV 1ST HR: Performed by: ORTHOPAEDIC SURGERY

## 2020-03-12 PROCEDURE — 88341 IMHCHEM/IMCYTCHM EA ADD ANTB: CPT | Performed by: PATHOLOGY

## 2020-03-12 PROCEDURE — 25000003 PHARM REV CODE 250

## 2020-03-12 PROCEDURE — 36000706: Performed by: ORTHOPAEDIC SURGERY

## 2020-03-12 PROCEDURE — 88311 PR  DECALCIFY TISSUE: ICD-10-PCS | Mod: 26,,, | Performed by: PATHOLOGY

## 2020-03-12 PROCEDURE — 88341 IMHCHEM/IMCYTCHM EA ADD ANTB: CPT | Mod: 26,,, | Performed by: PATHOLOGY

## 2020-03-12 PROCEDURE — 26951 AMPUTATION OF FINGER/THUMB: CPT | Mod: F4,,, | Performed by: ORTHOPAEDIC SURGERY

## 2020-03-12 PROCEDURE — 36000707: Performed by: ORTHOPAEDIC SURGERY

## 2020-03-12 PROCEDURE — 88342 IMHCHEM/IMCYTCHM 1ST ANTB: CPT | Mod: 26,,, | Performed by: PATHOLOGY

## 2020-03-12 PROCEDURE — 37000009 HC ANESTHESIA EA ADD 15 MINS: Performed by: ORTHOPAEDIC SURGERY

## 2020-03-12 PROCEDURE — 88365 PR  TISSUE HYBRIDIZATION: ICD-10-PCS | Mod: 26,,, | Performed by: PATHOLOGY

## 2020-03-12 PROCEDURE — 26593: ICD-10-PCS | Mod: 51,F4,, | Performed by: ORTHOPAEDIC SURGERY

## 2020-03-12 PROCEDURE — 37000008 HC ANESTHESIA 1ST 15 MINUTES: Performed by: ORTHOPAEDIC SURGERY

## 2020-03-12 PROCEDURE — 88302 TISSUE EXAM BY PATHOLOGIST: CPT | Performed by: PATHOLOGY

## 2020-03-12 PROCEDURE — 88302 PR  SURG PATH,LEVEL II: ICD-10-PCS | Mod: 26,,, | Performed by: PATHOLOGY

## 2020-03-12 PROCEDURE — D9220A PRA ANESTHESIA: Mod: CRNA,,, | Performed by: NURSE ANESTHETIST, CERTIFIED REGISTERED

## 2020-03-12 PROCEDURE — 63600175 PHARM REV CODE 636 W HCPCS: Performed by: ANESTHESIOLOGY

## 2020-03-12 PROCEDURE — 88311 DECALCIFY TISSUE: CPT | Mod: 26,,, | Performed by: PATHOLOGY

## 2020-03-12 PROCEDURE — 88365 INSITU HYBRIDIZATION (FISH): CPT | Performed by: PATHOLOGY

## 2020-03-12 PROCEDURE — 88342 IMHCHEM/IMCYTCHM 1ST ANTB: CPT | Performed by: PATHOLOGY

## 2020-03-12 PROCEDURE — 26951 PR AMPUTATION FINGER/THUMB: ICD-10-PCS | Mod: F4,,, | Performed by: ORTHOPAEDIC SURGERY

## 2020-03-12 PROCEDURE — 25000003 PHARM REV CODE 250: Performed by: ORTHOPAEDIC SURGERY

## 2020-03-12 PROCEDURE — 88311 DECALCIFY TISSUE: CPT | Performed by: PATHOLOGY

## 2020-03-12 PROCEDURE — D9220A PRA ANESTHESIA: Mod: ANES,,, | Performed by: ANESTHESIOLOGY

## 2020-03-12 PROCEDURE — D9220A PRA ANESTHESIA: ICD-10-PCS | Mod: ANES,,, | Performed by: ANESTHESIOLOGY

## 2020-03-12 PROCEDURE — S0028 INJECTION, FAMOTIDINE, 20 MG: HCPCS

## 2020-03-12 PROCEDURE — 88365 INSITU HYBRIDIZATION (FISH): CPT | Mod: 26,,, | Performed by: PATHOLOGY

## 2020-03-12 PROCEDURE — D9220A PRA ANESTHESIA: ICD-10-PCS | Mod: CRNA,,, | Performed by: NURSE ANESTHETIST, CERTIFIED REGISTERED

## 2020-03-12 PROCEDURE — 26593 RELEASE MUSCLES OF HAND: CPT | Mod: 51,F4,, | Performed by: ORTHOPAEDIC SURGERY

## 2020-03-12 PROCEDURE — 88302 TISSUE EXAM BY PATHOLOGIST: CPT | Mod: 26,,, | Performed by: PATHOLOGY

## 2020-03-12 PROCEDURE — 88364 INSITU HYBRIDIZATION (FISH): CPT | Performed by: PATHOLOGY

## 2020-03-12 PROCEDURE — 88342 CHG IMMUNOCYTOCHEMISTRY: ICD-10-PCS | Mod: 26,,, | Performed by: PATHOLOGY

## 2020-03-12 PROCEDURE — 71000033 HC RECOVERY, INTIAL HOUR: Performed by: ORTHOPAEDIC SURGERY

## 2020-03-12 PROCEDURE — S0020 INJECTION, BUPIVICAINE HYDRO: HCPCS | Performed by: ORTHOPAEDIC SURGERY

## 2020-03-12 RX ORDER — BUPIVACAINE HYDROCHLORIDE 5 MG/ML
INJECTION, SOLUTION EPIDURAL; INTRACAUDAL
Status: DISCONTINUED | OUTPATIENT
Start: 2020-03-12 | End: 2020-03-12 | Stop reason: HOSPADM

## 2020-03-12 RX ORDER — FAMOTIDINE 10 MG/ML
20 INJECTION INTRAVENOUS ONCE
Status: COMPLETED | OUTPATIENT
Start: 2020-03-12 | End: 2020-03-12

## 2020-03-12 RX ORDER — MEPERIDINE HYDROCHLORIDE 50 MG/ML
INJECTION INTRAMUSCULAR; INTRAVENOUS; SUBCUTANEOUS
Status: DISCONTINUED | OUTPATIENT
Start: 2020-03-12 | End: 2020-03-12

## 2020-03-12 RX ORDER — MORPHINE SULFATE 4 MG/ML
2 INJECTION, SOLUTION INTRAMUSCULAR; INTRAVENOUS EVERY 5 MIN PRN
Status: CANCELLED | OUTPATIENT
Start: 2020-03-12

## 2020-03-12 RX ORDER — ONDANSETRON 2 MG/ML
4 INJECTION INTRAMUSCULAR; INTRAVENOUS DAILY PRN
Status: CANCELLED | OUTPATIENT
Start: 2020-03-12

## 2020-03-12 RX ORDER — MUPIROCIN 20 MG/G
OINTMENT TOPICAL
Status: DISCONTINUED | OUTPATIENT
Start: 2020-03-12 | End: 2020-03-12 | Stop reason: HOSPADM

## 2020-03-12 RX ORDER — PROPOFOL 10 MG/ML
VIAL (ML) INTRAVENOUS
Status: DISCONTINUED | OUTPATIENT
Start: 2020-03-12 | End: 2020-03-12

## 2020-03-12 RX ORDER — MIDAZOLAM HYDROCHLORIDE 1 MG/ML
INJECTION, SOLUTION INTRAMUSCULAR; INTRAVENOUS
Status: DISCONTINUED | OUTPATIENT
Start: 2020-03-12 | End: 2020-03-12

## 2020-03-12 RX ORDER — SODIUM CHLORIDE 9 MG/ML
INJECTION, SOLUTION INTRAVENOUS CONTINUOUS
Status: DISCONTINUED | OUTPATIENT
Start: 2020-03-12 | End: 2020-03-12 | Stop reason: HOSPADM

## 2020-03-12 RX ORDER — CEFAZOLIN SODIUM 2 G/50ML
SOLUTION INTRAVENOUS
Status: DISCONTINUED
Start: 2020-03-12 | End: 2020-03-12 | Stop reason: HOSPADM

## 2020-03-12 RX ORDER — SODIUM CHLORIDE, SODIUM LACTATE, POTASSIUM CHLORIDE, CALCIUM CHLORIDE 600; 310; 30; 20 MG/100ML; MG/100ML; MG/100ML; MG/100ML
INJECTION, SOLUTION INTRAVENOUS CONTINUOUS
Status: DISCONTINUED | OUTPATIENT
Start: 2020-03-12 | End: 2020-03-12 | Stop reason: HOSPADM

## 2020-03-12 RX ORDER — CEFAZOLIN SODIUM 2 G/50ML
2 SOLUTION INTRAVENOUS
Status: DISCONTINUED | OUTPATIENT
Start: 2020-03-12 | End: 2020-03-12 | Stop reason: HOSPADM

## 2020-03-12 RX ORDER — FAMOTIDINE 10 MG/ML
INJECTION INTRAVENOUS
Status: COMPLETED
Start: 2020-03-12 | End: 2020-03-12

## 2020-03-12 RX ADMIN — SODIUM CHLORIDE, POTASSIUM CHLORIDE, SODIUM LACTATE AND CALCIUM CHLORIDE: 600; 310; 30; 20 INJECTION, SOLUTION INTRAVENOUS at 02:03

## 2020-03-12 RX ADMIN — ONDANSETRON 4 MG: 2 INJECTION INTRAMUSCULAR; INTRAVENOUS at 02:03

## 2020-03-12 RX ADMIN — MEPERIDINE HYDROCHLORIDE 50 MG: 50 INJECTION INTRAMUSCULAR; INTRAVENOUS; SUBCUTANEOUS at 02:03

## 2020-03-12 RX ADMIN — MUPIROCIN: 20 OINTMENT TOPICAL at 09:03

## 2020-03-12 RX ADMIN — MIDAZOLAM HYDROCHLORIDE 2 MG: 1 INJECTION, SOLUTION INTRAMUSCULAR; INTRAVENOUS at 02:03

## 2020-03-12 RX ADMIN — PROPOFOL 200 MG: 10 INJECTION, EMULSION INTRAVENOUS at 02:03

## 2020-03-12 RX ADMIN — SODIUM CHLORIDE 1000 ML: 0.9 INJECTION, SOLUTION INTRAVENOUS at 09:03

## 2020-03-12 RX ADMIN — FAMOTIDINE 20 MG: 10 INJECTION INTRAVENOUS at 09:03

## 2020-03-12 NOTE — ANESTHESIA PREPROCEDURE EVALUATION
03/12/2020  Anna Brock is a 34 y.o., female.    Anesthesia Evaluation    I have reviewed the Patient Summary Reports.    I have reviewed the Nursing Notes.   I have reviewed the Medications.     Review of Systems  Hematology/Oncology:  Hematology Normal   Oncology Normal     EENT/Dental:EENT/Dental Normal   Cardiovascular:  Cardiovascular Normal     Pulmonary:  Pulmonary Normal    Renal/:  Renal/ Normal     Hepatic/GI:  Hepatic/GI Normal    Musculoskeletal:  Musculoskeletal Normal    Neurological:  Neurology Normal    Endocrine:  Endocrine Normal        Physical Exam  General:  Well nourished    Airway/Jaw/Neck:  Airway Findings: Mouth Opening: Normal Tongue: Normal  General Airway Assessment: Adult  Mallampati: I  TM Distance: Normal, at least 6 cm       Chest/Lungs:  Chest/Lungs Findings: Clear to auscultation     Heart/Vascular:  Heart Findings: Rate: Normal  Rhythm: Regular Rhythm        Mental Status:  Mental Status Findings:  Cooperative, Alert and Oriented         Anesthesia Plan  Type of Anesthesia, risks & benefits discussed:  Anesthesia Type:  general  Patient's Preference:   Intra-op Monitoring Plan: standard ASA monitors  Intra-op Monitoring Plan Comments:   Post Op Pain Control Plan: IV/PO Opioids PRN  Post Op Pain Control Plan Comments:   Induction:   IV  Beta Blocker:  Patient is not currently on a Beta-Blocker (No further documentation required).       Informed Consent: Patient understands risks and agrees with Anesthesia plan.  Questions answered. Anesthesia consent signed with patient.  ASA Score: 2     Day of Surgery Review of History & Physical: I have interviewed and examined the patient. I have reviewed the patient's H&P dated:            Ready For Surgery From Anesthesia Perspective.

## 2020-03-12 NOTE — DISCHARGE SUMMARY
Ms. Brock was admitted through same-day was brought to the operating room where a DIP amputation was completed on her left small finger.  Postoperatively she was transferred from the operating room to the recovery room and when alert awake and oriented was discharged home in stable condition with instructions to follow up in 10-12 days.

## 2020-03-12 NOTE — TRANSFER OF CARE
"Anesthesia Transfer of Care Note    Patient: Anna Brock    Procedure(s) Performed: Procedure(s) (LRB):  AMPUTATION, FINGER (Left)    Patient location: PACU    Anesthesia Type: general    Transport from OR: Transported from OR on room air with adequate spontaneous ventilation    Post pain: adequate analgesia    Post assessment: no apparent anesthetic complications    Post vital signs: stable    Level of consciousness: sedated and responds to stimulation    Nausea/Vomiting: no nausea/vomiting    Complications: none    Transfer of care protocol was followed      Last vitals:   Visit Vitals  BP (!) 136/97 (BP Location: Right arm, Patient Position: Sitting)   Pulse 66   Temp 36.7 °C (98.1 °F) (Oral)   Resp 14   Ht 5' 6" (1.676 m)   Wt 67.6 kg (149 lb)   SpO2 100%   Breastfeeding? No   BMI 24.05 kg/m²     "
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
## 2020-03-12 NOTE — ANESTHESIA POSTPROCEDURE EVALUATION
Anesthesia Post Evaluation    Patient: Anna Brock    Procedure(s) Performed: Procedure(s) (LRB):  AMPUTATION, FINGER (Left)    Final Anesthesia Type: general    Patient location during evaluation: PACU  Patient participation: Yes- Able to Participate  Level of consciousness: awake and alert  Post-procedure vital signs: reviewed and stable  Pain management: adequate  Airway patency: patent    PONV status at discharge: No PONV  Anesthetic complications: no      Cardiovascular status: blood pressure returned to baseline  Respiratory status: unassisted  Hydration status: euvolemic  Follow-up not needed.          Vitals Value Taken Time   /98 3/12/2020  3:43 PM   Temp 36.7 °C (98.1 °F) 3/12/2020  9:20 AM   Pulse 70 3/12/2020  3:43 PM   Resp 9 3/12/2020  3:43 PM   SpO2 100 % 3/12/2020  3:43 PM         Event Time     Out of Recovery 15:30:00          Pain/Kiki Score: Kiki Score: 9 (3/12/2020  3:15 PM)

## 2020-03-12 NOTE — INTERVAL H&P NOTE
The patient has been examined and the H&P has been reviewed:  Operative extremity signed at 10:00 a.m..  H&P updated at 10:02 a.m..  Patient rate to roll to operating room at 10:02 a.m..    I concur with the findings and no changes have occurred since H&P was written.    Anesthesia/Surgery risks, benefits and alternative options discussed and understood by patient/family.          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

## 2020-03-13 ENCOUNTER — TELEPHONE (OUTPATIENT)
Dept: ORTHOPEDICS | Facility: CLINIC | Age: 35
End: 2020-03-13

## 2020-03-13 VITALS
TEMPERATURE: 98 F | RESPIRATION RATE: 9 BRPM | DIASTOLIC BLOOD PRESSURE: 98 MMHG | HEART RATE: 70 BPM | OXYGEN SATURATION: 100 % | BODY MASS INDEX: 23.95 KG/M2 | HEIGHT: 66 IN | SYSTOLIC BLOOD PRESSURE: 121 MMHG | WEIGHT: 149 LBS

## 2020-03-13 NOTE — TELEPHONE ENCOUNTER
Called patient to ask how she is doing after surgery with Dr. Fairchild and to verify her post op appointment. Patient voiced that she is doing well after surgery and has no questions or concerns at this time. Post op appointment verified. Encouraged patient to call the office if she has any questions or concerns.

## 2020-03-23 ENCOUNTER — OFFICE VISIT (OUTPATIENT)
Dept: ORTHOPEDICS | Facility: CLINIC | Age: 35
End: 2020-03-23
Payer: COMMERCIAL

## 2020-03-23 VITALS
HEIGHT: 66 IN | HEART RATE: 65 BPM | BODY MASS INDEX: 23.95 KG/M2 | RESPIRATION RATE: 18 BRPM | TEMPERATURE: 98 F | WEIGHT: 149.06 LBS | OXYGEN SATURATION: 100 %

## 2020-03-23 DIAGNOSIS — Z51.89 AFTER CARE: ICD-10-CM

## 2020-03-23 DIAGNOSIS — Z48.02 VISIT FOR SUTURE REMOVAL: Primary | ICD-10-CM

## 2020-03-23 PROCEDURE — 99024 PR POST-OP FOLLOW-UP VISIT: ICD-10-PCS | Mod: S$GLB,,, | Performed by: ORTHOPAEDIC SURGERY

## 2020-03-23 PROCEDURE — 99999 PR PBB SHADOW E&M-EST. PATIENT-LVL III: CPT | Mod: PBBFAC,,, | Performed by: ORTHOPAEDIC SURGERY

## 2020-03-23 PROCEDURE — 99999 PR PBB SHADOW E&M-EST. PATIENT-LVL III: ICD-10-PCS | Mod: PBBFAC,,, | Performed by: ORTHOPAEDIC SURGERY

## 2020-03-23 PROCEDURE — 99024 POSTOP FOLLOW-UP VISIT: CPT | Mod: S$GLB,,, | Performed by: ORTHOPAEDIC SURGERY

## 2020-03-23 RX ORDER — HYDROCODONE BITARTRATE AND ACETAMINOPHEN 7.5; 325 MG/1; MG/1
TABLET ORAL
COMMUNITY
Start: 2020-03-12 | End: 2022-03-16 | Stop reason: CLARIF

## 2020-03-23 NOTE — PROGRESS NOTES
Subjective:      Patient ID: Anna Brock is a 34 y.o. female.    Chief Complaint: Post-op Evaluation of the Left Hand      HPI:   Ms. Brock returns today for her 1st postop visit on a amputation of the tip of her left small finger.  She states she is doing well and her pain is well controlled.  She stated her pain is much less than her previous surgery.  She had her amputation on 03/12/2020.    ROS:   New diagnosis/ surgery/prescriptions since last office visit on 02/12/2020:  Amputation tip left small finger      Objective:      Physical Exam:   General: AAOx3.  No acute distress  Vascular:  Pulses intact and equal bilaterally.  Capillary refill less than 3 seconds and equal bilaterally  Neurologic:  Pinprick and soft touch intact and equal bilaterally  Integment:  No ecchymosis, no errythema.  Incision well approximated with sutures in place.  Extremity:   Left small finger:  DIP amputation.  Decreased motion at PIP.  No swelling.  Tender with palpation tip left small finger.  Radiography:    No x-ray done today.      Assessment:       Impression:      1. DIP amputation tip left small finger         Plan:       1.  Discussed physical examination with the patient. Anna understands that she had a DIP amputation tip of her left small finger.  2. Remove sutures and place Steri-Strips.  3. Start home exercise program with flexion extension exercises the PIP of the left small finger.  These were shown to the patient.  4. One-handed activities with the right hand only no lifting/ pushing / pulling requiring the  Use of the left hand.  5. Any pain can be treated with over-the-counter medications dosed per box instructions.  6. [facility] portal was discussed with the patient and information was given.  The patient was encouraged to use the portal for future encounters.  7. Follow up in 1 month expect to forward the patient to occupational therapy at her follow-up.

## 2020-03-26 LAB
COMMENT: NORMAL
FINAL PATHOLOGIC DIAGNOSIS: NORMAL
GROSS: NORMAL
Lab: NORMAL

## 2020-04-20 ENCOUNTER — OFFICE VISIT (OUTPATIENT)
Dept: ORTHOPEDICS | Facility: CLINIC | Age: 35
End: 2020-04-20
Payer: COMMERCIAL

## 2020-04-20 VITALS
WEIGHT: 149.06 LBS | HEIGHT: 66 IN | HEART RATE: 71 BPM | TEMPERATURE: 98 F | RESPIRATION RATE: 18 BRPM | BODY MASS INDEX: 23.95 KG/M2 | OXYGEN SATURATION: 100 %

## 2020-04-20 DIAGNOSIS — Z89.022 STATUS POST SURGICAL AMPUTATION OF FINGER OF LEFT HAND: Primary | ICD-10-CM

## 2020-04-20 PROCEDURE — 99024 PR POST-OP FOLLOW-UP VISIT: ICD-10-PCS | Mod: S$GLB,,, | Performed by: ORTHOPAEDIC SURGERY

## 2020-04-20 PROCEDURE — 99999 PR PBB SHADOW E&M-EST. PATIENT-LVL III: ICD-10-PCS | Mod: PBBFAC,,, | Performed by: ORTHOPAEDIC SURGERY

## 2020-04-20 PROCEDURE — 99024 POSTOP FOLLOW-UP VISIT: CPT | Mod: S$GLB,,, | Performed by: ORTHOPAEDIC SURGERY

## 2020-04-20 PROCEDURE — 99999 PR PBB SHADOW E&M-EST. PATIENT-LVL III: CPT | Mod: PBBFAC,,, | Performed by: ORTHOPAEDIC SURGERY

## 2020-04-20 NOTE — PROGRESS NOTES
Subjective:      Patient ID: Anna Brock is a 34 y.o. female.    Chief Complaint: Post-op Evaluation of the Left Hand      HPI: Ms. Brock returns today for follow-up on the DIP amputation of her left small finger which was performed on 03/12/2020.  She is now approximately 5 weeks postop.  She states she is doing well and her pain is well controlled but she is a little stiff nerve finger.    ROS:  No new diagnosis/surgery/prescriptions since last office visit on 03/23/2020.  Constitution: Negative for chills and fever.   HENT: Negative for congestion.    Eyes: Negative for blurred vision.   Cardiovascular: Negative for chest pain.   Respiratory: Negative for cough.    Endocrine: Negative for polydipsia.   Hematologic/Lymphatic: Negative for adenopathy.   Skin: Negative for flushing and itching.   Musculoskeletal: Positive for joint pain and joint swelling. Negative for gout.   Gastrointestinal: Positive for heartburn. Negative for constipation and diarrhea.   Genitourinary: Negative for nocturia.   Neurological: Positive for headaches. Negative for seizures.   Psychiatric/Behavioral: Negative for depression. The patient is not nervous/anxious.    Allergic/Immunologic: Positive for environmental allergies.       Objective:      Physical Exam:   General: AAOx3.  No acute distress  Vascular:  Pulses intact and equal bilaterally.  Capillary refill less than 3 seconds and equal bilaterally  Neurologic:  Pinprick and soft touch intact and equal bilaterally  Integment:  No ecchymosis, no errythema.  Incisions well healed  Extremity:  Finger:  MCP flexion/extension equal bilaterally.  PIP extension/flexion 0/20° contralateral extremity 0/90 degrees.  Relatively nontender with palpation.  Relatively nontender with motion.  No swelling.  Radiography:  No x-ray done today.      Assessment:       Impression:     1. Status post surgical amputation of finger of left hand          Plan:       1.  Discussed physical  examination with the patient. Anna understands that she appears to be healing well but needs to get more aggressive with finger motion.  2.  Refer to occupational therapy to start aggressive finger motion exercises.  3.  Home exercises to include finger flexion exercises were shown discussed with the patient.  She can also use vitamin E on her finger to soften scars.  4.  Continue with home desensitization program.  5.  May start using hand as tolerated.  6.  Any minor pain can be treated with over-the-counter medications dosed per box instructions.  7.  [facility] portal was discussed with the patient and information was given.  The patient was encouraged to use the portal for future encounters.  8.  Follow up in approximately 6 weeks for re-evaluation

## 2020-05-20 ENCOUNTER — OFFICE VISIT (OUTPATIENT)
Dept: ORTHOPEDICS | Facility: CLINIC | Age: 35
End: 2020-05-20
Payer: COMMERCIAL

## 2020-05-20 VITALS — BODY MASS INDEX: 23.95 KG/M2 | HEIGHT: 66 IN | WEIGHT: 149.06 LBS | RESPIRATION RATE: 18 BRPM | TEMPERATURE: 99 F

## 2020-05-20 DIAGNOSIS — Z89.022 STATUS POST SURGICAL AMPUTATION OF FINGER OF LEFT HAND: Primary | ICD-10-CM

## 2020-05-20 PROCEDURE — 99024 POSTOP FOLLOW-UP VISIT: CPT | Mod: S$GLB,,, | Performed by: ORTHOPAEDIC SURGERY

## 2020-05-20 PROCEDURE — 99999 PR PBB SHADOW E&M-EST. PATIENT-LVL III: CPT | Mod: PBBFAC,,, | Performed by: ORTHOPAEDIC SURGERY

## 2020-05-20 PROCEDURE — 99024 PR POST-OP FOLLOW-UP VISIT: ICD-10-PCS | Mod: S$GLB,,, | Performed by: ORTHOPAEDIC SURGERY

## 2020-05-20 PROCEDURE — 99999 PR PBB SHADOW E&M-EST. PATIENT-LVL III: ICD-10-PCS | Mod: PBBFAC,,, | Performed by: ORTHOPAEDIC SURGERY

## 2020-05-20 NOTE — PROGRESS NOTES
Subjective:      Patient ID: Anna Brock is a 34 y.o. female.    Chief Complaint: Post-op Evaluation of the Left Hand      HPI:  Ms. Brock returns today for follow-up on DIP amputation of her left small finger.  Her date of surgery 03/12/2020.  She is now approximately 2 and half months postop.  She states she is doing well and has regained most of her motion she still has a slight flexion lag.  Her pain is well controlled.    ROS:  No new diagnosis/surgery/prescriptions since last office visit on 04/20/2020.  Constitution: Negative for chills and fever.   HENT: Negative for congestion.    Eyes: Negative for blurred vision.   Cardiovascular: Negative for chest pain.   Respiratory: Negative for cough.    Endocrine: Negative for polydipsia.   Hematologic/Lymphatic: Negative for adenopathy.   Skin: Negative for flushing and itching.   Musculoskeletal: Positive for joint pain and joint swelling. Negative for gout.   Gastrointestinal: Positive for heartburn. Negative for constipation and diarrhea.   Genitourinary: Negative for nocturia.   Neurological: Positive for headaches. Negative for seizures.   Psychiatric/Behavioral: Negative for depression. The patient is not nervous/anxious.    Allergic/Immunologic: Positive for environmental allergies.      Objective:      Physical Exam:   General: AAOx3.  No acute distress  Vascular:  Pulses intact and equal bilaterally.  Capillary refill less than 3 seconds and equal bilaterally  Neurologic:  Pinprick and soft touch intact and equal bilaterally  Integment:  No ecchymosis, no errythema.  Incisions well healed.  Extremity:  Extension full left small finger equal to the chondral extremity.  Approximately 10° extension flexion loss at PIP left small finger MCP equal to the contralateral extremity.  Nontender with motion.  Nontender with palpation.  No swelling.  Radiography:  No new x-ray done today.      Assessment:       Impression:  DIP amputation, left small finger.       Plan:       1.  Discussed physical examination with the patient. Anna understands that she appears to be doing well and has regained near all of her motion.  The tip her finger is no longer painful.  At this point she can return to full activities.  2.  Return to full unrestricted activities.  3.  Any minor pain can be treated with over-the-counter medications dosed per box instructions.  4.  Continue with home exercises to include aggressive flexion exercises the left small finger.  5.  May discontinue occupational therapy and be discharged HEP.  6.  [facility] portal was discussed with the patient and information was given.  The patient was encouraged to use the portal for future encounters.  7.  Follow up p.r.n.

## 2020-09-29 ENCOUNTER — OFFICE VISIT (OUTPATIENT)
Dept: FAMILY MEDICINE | Facility: CLINIC | Age: 35
End: 2020-09-29
Payer: COMMERCIAL

## 2020-09-29 DIAGNOSIS — F32.A DEPRESSION, UNSPECIFIED DEPRESSION TYPE: Primary | ICD-10-CM

## 2020-09-29 DIAGNOSIS — F41.1 GAD (GENERALIZED ANXIETY DISORDER): ICD-10-CM

## 2020-09-29 PROCEDURE — 99203 OFFICE O/P NEW LOW 30 MIN: CPT | Mod: 95,,, | Performed by: FAMILY MEDICINE

## 2020-09-29 PROCEDURE — 99203 PR OFFICE/OUTPT VISIT, NEW, LEVL III, 30-44 MIN: ICD-10-PCS | Mod: 95,,, | Performed by: FAMILY MEDICINE

## 2020-09-29 RX ORDER — BUSPIRONE HYDROCHLORIDE 7.5 MG/1
7.5 TABLET ORAL 3 TIMES DAILY PRN
Qty: 90 TABLET | Refills: 2 | Status: SHIPPED | OUTPATIENT
Start: 2020-09-29 | End: 2022-04-18

## 2020-09-29 RX ORDER — DULOXETIN HYDROCHLORIDE 30 MG/1
60 CAPSULE, DELAYED RELEASE ORAL DAILY
Qty: 60 CAPSULE | Refills: 0 | Status: SHIPPED | OUTPATIENT
Start: 2020-09-29 | End: 2020-11-16 | Stop reason: DRUGHIGH
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
## 2020-11-11 ENCOUNTER — PATIENT MESSAGE (OUTPATIENT)
Dept: ORTHOPEDICS | Facility: CLINIC | Age: 35
End: 2020-11-11

## 2020-11-14 ENCOUNTER — PATIENT MESSAGE (OUTPATIENT)
Dept: FAMILY MEDICINE | Facility: CLINIC | Age: 35
End: 2020-11-14

## 2020-11-14 DIAGNOSIS — F32.A DEPRESSION, UNSPECIFIED DEPRESSION TYPE: ICD-10-CM

## 2020-11-14 DIAGNOSIS — F41.1 GAD (GENERALIZED ANXIETY DISORDER): ICD-10-CM

## 2020-11-16 RX ORDER — DULOXETIN HYDROCHLORIDE 60 MG/1
60 CAPSULE, DELAYED RELEASE ORAL DAILY
Qty: 30 CAPSULE | Refills: 3 | Status: SHIPPED | OUTPATIENT
Start: 2020-11-16 | End: 2022-04-18

## 2020-11-16 RX ORDER — DULOXETIN HYDROCHLORIDE 30 MG/1
60 CAPSULE, DELAYED RELEASE ORAL DAILY
Qty: 60 CAPSULE | Refills: 0 | Status: CANCELLED | OUTPATIENT
Start: 2020-11-16 | End: 2021-11-16

## 2022-01-11 ENCOUNTER — PATIENT MESSAGE (OUTPATIENT)
Dept: ADMINISTRATIVE | Facility: HOSPITAL | Age: 37
End: 2022-01-11
Payer: COMMERCIAL

## 2022-01-12 DIAGNOSIS — Z11.59 NEED FOR HEPATITIS C SCREENING TEST: ICD-10-CM

## 2022-03-16 ENCOUNTER — HOSPITAL ENCOUNTER (EMERGENCY)
Facility: HOSPITAL | Age: 37
Discharge: HOME | End: 2022-03-16
Attending: EMERGENCY MEDICINE

## 2022-03-16 VITALS
TEMPERATURE: 98 F | RESPIRATION RATE: 18 BRPM | HEART RATE: 76 BPM | OXYGEN SATURATION: 99 % | DIASTOLIC BLOOD PRESSURE: 72 MMHG | BODY MASS INDEX: 23.9 KG/M2 | SYSTOLIC BLOOD PRESSURE: 130 MMHG | WEIGHT: 140 LBS | HEIGHT: 64 IN

## 2022-03-16 DIAGNOSIS — J02.9 EXUDATIVE PHARYNGITIS: Primary | ICD-10-CM

## 2022-03-16 DIAGNOSIS — L42 PITYRIASIS ROSEA: ICD-10-CM

## 2022-03-16 DIAGNOSIS — B00.1 HERPES LABIALIS: ICD-10-CM

## 2022-03-16 DIAGNOSIS — H66.90 OTITIS MEDIA, UNSPECIFIED LATERALITY, UNSPECIFIED OTITIS MEDIA TYPE: ICD-10-CM

## 2022-03-16 DIAGNOSIS — N89.8 VAGINAL DISCHARGE: ICD-10-CM

## 2022-03-16 DIAGNOSIS — H66.003 NON-RECURRENT ACUTE SUPPURATIVE OTITIS MEDIA OF BOTH EARS WITHOUT SPONTANEOUS RUPTURE OF TYMPANIC MEMBRANES: ICD-10-CM

## 2022-03-16 DIAGNOSIS — A60.04 HERPES SIMPLEX VULVOVAGINITIS: ICD-10-CM

## 2022-03-16 LAB — GROUP A STREP, MOLECULAR: NEGATIVE

## 2022-03-16 PROCEDURE — 87651 STREP A DNA AMP PROBE: CPT | Performed by: NURSE PRACTITIONER

## 2022-03-16 PROCEDURE — 87591 N.GONORRHOEAE DNA AMP PROB: CPT | Performed by: NURSE PRACTITIONER

## 2022-03-16 PROCEDURE — 99284 EMERGENCY DEPT VISIT MOD MDM: CPT | Mod: 25

## 2022-03-16 PROCEDURE — 87491 CHLMYD TRACH DNA AMP PROBE: CPT | Performed by: NURSE PRACTITIONER

## 2022-03-16 RX ORDER — CEFDINIR 300 MG/1
300 CAPSULE ORAL 2 TIMES DAILY
Qty: 20 CAPSULE | Refills: 0 | Status: SHIPPED | OUTPATIENT
Start: 2022-03-16 | End: 2022-03-26

## 2022-03-16 RX ORDER — VALACYCLOVIR HYDROCHLORIDE 1 G/1
1000 TABLET, FILM COATED ORAL EVERY 12 HOURS
Qty: 14 TABLET | Refills: 0 | Status: SHIPPED | OUTPATIENT
Start: 2022-03-16 | End: 2022-04-18

## 2022-03-16 RX ORDER — FLUCONAZOLE 150 MG/1
150 TABLET ORAL DAILY
Qty: 1 TABLET | Refills: 1 | Status: SHIPPED | OUTPATIENT
Start: 2022-03-16 | End: 2022-03-17

## 2022-03-17 NOTE — ED PROVIDER NOTES
"Encounter Date: 3/16/2022       History     Chief Complaint   Patient presents with    Sore Throat     Pt reports "swollen tonsils" with "milky" drainage x 2 weeks.      The history is provided by the patient.   Sore Throat   This is a new problem. The current episode started several weeks ago. The problem has been gradually worsening. There has been no fever. The fever has been present for 5 days or more. The pain is at a severity of 5/10. Associated symptoms include swollen glands. She has had no exposure to strep or mono. She has tried gargles for the symptoms. The treatment provided no relief.   Rash   This is a new problem. The current episode started several days ago. The problem has been unchanged. The problem is associated with an unknown factor. The rash is present on the torso. The pain is at a severity of 0/10. Pertinent negatives include no blisters, no itching, no pain and no weeping. She has tried nothing for the symptoms.   Female  Problem  Primary symptoms include discharge, genital rash.    Primary symptoms include no dysuria.   This is a new problem. The current episode started several days ago. The problem occurs constantly. The problem has been unchanged. The symptoms occur during intercourse. The discharge was white and malodorous.     Review of patient's allergies indicates:  No Known Allergies  Past Medical History:   Diagnosis Date    Anxiety     Depression      Past Surgical History:   Procedure Laterality Date    APPENDECTOMY      BREAST CYST EXCISION Left     DILATION AND CURETTAGE OF UTERUS      FINGER AMPUTATION Left 3/12/2020    Procedure: AMPUTATION, FINGER;  Surgeon: Lucian Fairchild DO;  Location: [facility] OR;  Service: Orthopedics;  Laterality: Left;  Amputation of the Distal Phalanx, Left Small Finger      HYSTERECTOMY      OPEN REDUCTION AND INTERNAL FIXATION (ORIF) OF INJURY OF FINGER Left 9/26/2019    Procedure: ORIF, FINGER;  Surgeon: Lucian Fairchild DO;  Location: " [facility] OR;  Service: Orthopedics;  Laterality: Left;  Equipment: [employer] Mini Frag Set/ Hand Set  Vendor/Rep: [employer]  C-Arm: Entire  REQUIRES FIRST ASSISTANT ITZEL      TENOTOMY Left 3/12/2020    Procedure: TENOTOMY;  Surgeon: Lucian Fairchild DO;  Location: [facility] OR;  Service: Orthopedics;  Laterality: Left;     Family History   Problem Relation Age of Onset    No Known Problems Mother     No Known Problems Father     No Known Problems Sister     No Known Problems Brother     No Known Problems Son     No Known Problems Daughter     No Known Problems Daughter      Social History     Tobacco Use    Smoking status: Current Every Day Smoker     Packs/day: 1.00     Years: 15.00     Pack years: 15.00     Types: Cigarettes    Smokeless tobacco: Never Used   Substance Use Topics    Alcohol use: Yes     Comment: occ    Drug use: Yes     Types: Marijuana     Review of Systems   HENT: Positive for sore throat.    Genitourinary: Positive for genital sores and vaginal discharge. Negative for dysuria.   Skin: Positive for rash. Negative for itching.   All other systems reviewed and are negative.      Physical Exam     Initial Vitals [03/16/22 1904]   BP Pulse Resp Temp SpO2   130/72 76 18 97.8 °F (36.6 °C) 99 %      MAP       --         Physical Exam    Nursing note and vitals reviewed.  Constitutional: She appears well-developed and well-nourished. No distress.   HENT:   Head: Normocephalic and atraumatic.   Right Ear: Tympanic membrane is erythematous. Tympanic membrane is not perforated. A middle ear effusion is present.   Left Ear: Tympanic membrane is erythematous. Tympanic membrane is not perforated. A middle ear effusion is present.   Eyes: EOM are normal. Pupils are equal, round, and reactive to light.   Neck:   Normal range of motion.  Cardiovascular: Normal rate and regular rhythm.   No murmur heard.  Pulmonary/Chest: Breath sounds normal. No respiratory distress. She has no wheezes. She has no rhonchi.  She has no rales. She exhibits no tenderness.   Abdominal: Abdomen is soft.   Genitourinary: There is lesion on the right labia. There is lesion on the left labia.       Musculoskeletal:         General: Normal range of motion.      Cervical back: Normal range of motion.     Neurological: She is alert and oriented to person, place, and time. She has normal strength. GCS score is 15. GCS eye subscore is 4. GCS verbal subscore is 5. GCS motor subscore is 6.   Skin: Skin is warm and dry. Rash is not urticarial.        Psychiatric: She has a normal mood and affect.         ED Course   Procedures  Labs Reviewed   GROUP A STREP, MOLECULAR   C. TRACHOMATIS/N. GONORRHOEAE BY AMP DNA          Imaging Results    None          Medications - No data to display  Medical Decision Making:   Differential Diagnosis:   Strep throat, vaginal discharge, allergic contact dermatitis, pityriasis rosea, STD  ED Management:  Patient has been in relationship with current partner for 3 months. Ordered gonorrhea/chlamydia test due to abnormal vaginal discharge  History of HPV, cervical cancer, hysterectomy  Will treat genital herpes, exudative throat, ear infection with oral antibiotic.  Referred to PCP, dermatology   Please return for new, changing, or worsening pain. The patient was also instructed that follow up with a primary care physician is strongly recommended after any visit to the ED.  Patient expressed understanding and agreed with treatment plan and was discharged in stable condition.                             Clinical Impression:   Final diagnoses:  [J02.9] Exudative pharyngitis (Primary)  [H66.90] otitis media  [H66.003] Non-recurrent acute suppurative otitis media of both ears without spontaneous rupture of tympanic membranes  [B00.1] Herpes labialis  [N89.8] Vaginal discharge  [L42] Pityriasis rosea  [A60.04] Herpes simplex vulvovaginitis          ED Disposition Condition    Discharge Stable        ED Prescriptions      Medication Sig Dispense Start Date End Date Auth. Provider    valACYclovir (VALTREX) 1000 MG tablet Take 1 tablet (1,000 mg total) by mouth every 12 (twelve) hours. for 7 days 14 tablet 3/16/2022 3/23/2022 Argenis Sloan NP    cefdinir (OMNICEF) 300 MG capsule Take 1 capsule (300 mg total) by mouth 2 (two) times daily. for 10 days 20 capsule 3/16/2022 3/26/2022 Argenis Sloan NP    fluconazole (DIFLUCAN) 150 MG Tab Take 1 tablet (150 mg total) by mouth once daily. for 1 day 1 tablet 3/16/2022 3/17/2022 Argenis Sloan NP        Follow-up Information     Follow up With Specialties Details Why Contact Info    Lisa Becerra MD Family Medicine In 2 days  [address] [facility] [address] [phone]      [facility] Emergency Dept Emergency Medicine  If symptoms worsen [address] [facility] [address] [phone]           Argenis Sloan NP  03/16/22 2026

## 2022-03-17 NOTE — DISCHARGE INSTRUCTIONS
Take the medications as prescribed. Return for any worsening or new symptoms. Follow up with Primary Care Provider in the next 2-3 days.

## 2022-03-18 LAB
C TRACH DNA SPEC QL NAA+PROBE: NOT DETECTED
N GONORRHOEA DNA SPEC QL NAA+PROBE: NOT DETECTED

## 2022-04-18 ENCOUNTER — OFFICE VISIT (OUTPATIENT)
Dept: FAMILY MEDICINE | Facility: CLINIC | Age: 37
End: 2022-04-18

## 2022-04-18 VITALS
HEIGHT: 64 IN | BODY MASS INDEX: 27.34 KG/M2 | HEART RATE: 67 BPM | OXYGEN SATURATION: 97 % | RESPIRATION RATE: 16 BRPM | WEIGHT: 160.13 LBS | DIASTOLIC BLOOD PRESSURE: 70 MMHG | SYSTOLIC BLOOD PRESSURE: 106 MMHG

## 2022-04-18 DIAGNOSIS — Z13.220 ENCOUNTER FOR LIPID SCREENING FOR CARDIOVASCULAR DISEASE: ICD-10-CM

## 2022-04-18 DIAGNOSIS — Z00.00 ANNUAL PHYSICAL EXAM: Primary | ICD-10-CM

## 2022-04-18 DIAGNOSIS — Z13.6 ENCOUNTER FOR LIPID SCREENING FOR CARDIOVASCULAR DISEASE: ICD-10-CM

## 2022-04-18 PROCEDURE — 99213 OFFICE O/P EST LOW 20 MIN: CPT | Mod: PBBFAC | Performed by: FAMILY MEDICINE

## 2022-04-18 PROCEDURE — 99999 PR PBB SHADOW E&M-EST. PATIENT-LVL III: ICD-10-PCS | Mod: PBBFAC,,, | Performed by: FAMILY MEDICINE

## 2022-04-18 PROCEDURE — 99395 PR PREVENTIVE VISIT,EST,18-39: ICD-10-PCS | Mod: S$PBB,,, | Performed by: FAMILY MEDICINE

## 2022-04-18 PROCEDURE — 99395 PREV VISIT EST AGE 18-39: CPT | Mod: S$PBB,,, | Performed by: FAMILY MEDICINE

## 2022-04-18 PROCEDURE — 99999 PR PBB SHADOW E&M-EST. PATIENT-LVL III: CPT | Mod: PBBFAC,,, | Performed by: FAMILY MEDICINE

## 2022-04-18 NOTE — PROGRESS NOTES
"[facility] - Clinic Note    Subjective      Ms. Brock is a 36 y.o. female who presents to clinic for an annual.     Due for an annual.   No prior PCP.   Not on any medications.   No insurance.       PMH Anna has a past medical history of Anxiety and Depression.   PSXH Anna has a past surgical history that includes Hysterectomy; Dilation and curettage of uterus; Breast cyst excision (Left); Appendectomy; Open reduction and internal fixation (ORIF) of injury of finger (Left, 9/26/2019); Finger amputation (Left, 3/12/2020); and Tenotomy (Left, 3/12/2020).   FH Anna's family history includes No Known Problems in her brother, daughter, daughter, father, mother, sister, and son.   SH Anna reports that she has been smoking cigarettes. She has a 15.00 pack-year smoking history. She has never used smokeless tobacco. She reports current alcohol use. She reports current drug use. Drug: Marijuana.   ALG Anna has No Known Allergies.   MED Anna has a current medication list which includes the following Facility-Administered Medications: diphenhydramine, lactated ringers, lidocaine (pf) 10 mg/ml (1%), morphine, ondansetron, and promethazine (PHENERGAN) 6.25 mg in dextrose 5 % 50 mL IVPB.     Review of Systems   Constitutional: Negative for activity change, appetite change, chills, fatigue and fever.   Eyes: Negative for visual disturbance.   Respiratory: Negative for cough and shortness of breath.    Cardiovascular: Negative for chest pain, palpitations and leg swelling.   Gastrointestinal: Negative for abdominal pain, nausea and vomiting.   Skin: Negative for wound.   Neurological: Negative for dizziness and headaches.   Psychiatric/Behavioral: Negative for confusion.     Objective     /70 (BP Location: Left arm, Patient Position: Sitting, BP Method: Medium (Manual))   Pulse 67   Resp 16   Ht 5' 4" (1.626 m)   Wt 72.6 kg (160 lb 2 oz)   SpO2 97%   BMI 27.49 kg/m²     Physical Exam   Constitutional: " She appears well-developed and well-nourished.  Non-toxic appearance. She does not appear ill. No distress.   HENT:   Head: Normocephalic and atraumatic.   Eyes: Right eye exhibits no discharge. Left eye exhibits no discharge.   Ecchymosis noted under the right eye   Cardiovascular: Normal rate, regular rhythm, normal heart sounds and normal pulses. Exam reveals no gallop and no friction rub.   No murmur heard.  Pulmonary/Chest: Effort normal and breath sounds normal. No respiratory distress. She has no wheezes. She has no rales.   Abdominal: Normal appearance.   Musculoskeletal:      Cervical back: Neck supple.   Lymphadenopathy:     She has no cervical adenopathy.   Neurological: She is alert.   Skin: Skin is warm and dry. Capillary refill takes less than 2 seconds. She is not diaphoretic.   Psychiatric: Her behavior is normal. Mood, judgment and thought content normal.   Vitals reviewed.     Assessment/Plan     Anna was seen today for annual exam.    Diagnoses and all orders for this visit:  -New patient and new problem to me    Annual physical exam  -     Comprehensive Metabolic Panel; Future  -     Lipid Panel; Future  -     TSH; Future  -     CBC Auto Differential; Future    Encounter for lipid screening for cardiovascular disease  -     Lipid Panel; Future      -number given for financial assistance. If qualifies patient will make an appt for labs and pap    Lisa Becerra MD  Family Medicine  [facility]

## 2022-10-20 ENCOUNTER — HOSPITAL ENCOUNTER (EMERGENCY)
Facility: HOSPITAL | Age: 37
Discharge: HOME | End: 2022-10-20
Attending: EMERGENCY MEDICINE

## 2022-10-20 VITALS
HEIGHT: 64 IN | SYSTOLIC BLOOD PRESSURE: 139 MMHG | OXYGEN SATURATION: 100 % | WEIGHT: 165 LBS | BODY MASS INDEX: 28.17 KG/M2 | HEART RATE: 79 BPM | RESPIRATION RATE: 18 BRPM | TEMPERATURE: 98 F | DIASTOLIC BLOOD PRESSURE: 97 MMHG

## 2022-10-20 DIAGNOSIS — J01.90 ACUTE BACTERIAL SINUSITIS: Primary | ICD-10-CM

## 2022-10-20 DIAGNOSIS — B96.89 ACUTE BACTERIAL SINUSITIS: Primary | ICD-10-CM

## 2022-10-20 LAB
ALBUMIN SERPL BCP-MCNC: 3.6 G/DL (ref 3.5–5.2)
ALP SERPL-CCNC: 61 U/L (ref 55–135)
ALT SERPL W/O P-5'-P-CCNC: 25 U/L (ref 10–44)
ANION GAP SERPL CALC-SCNC: 11 MMOL/L (ref 8–16)
AST SERPL-CCNC: 24 U/L (ref 10–40)
BASOPHILS # BLD AUTO: 0.04 K/UL (ref 0–0.2)
BASOPHILS NFR BLD: 0.8 % (ref 0–1.9)
BILIRUB SERPL-MCNC: 0.2 MG/DL (ref 0.1–1)
BUN SERPL-MCNC: 18 MG/DL (ref 6–20)
CALCIUM SERPL-MCNC: 8.5 MG/DL (ref 8.7–10.5)
CHLORIDE SERPL-SCNC: 105 MMOL/L (ref 95–110)
CO2 SERPL-SCNC: 22 MMOL/L (ref 23–29)
CREAT SERPL-MCNC: 0.7 MG/DL (ref 0.5–1.4)
DIFFERENTIAL METHOD: ABNORMAL
EOSINOPHIL # BLD AUTO: 0.1 K/UL (ref 0–0.5)
EOSINOPHIL NFR BLD: 1.8 % (ref 0–8)
ERYTHROCYTE [DISTWIDTH] IN BLOOD BY AUTOMATED COUNT: 13.1 % (ref 11.5–14.5)
EST. GFR  (NO RACE VARIABLE): >60 ML/MIN/1.73 M^2
GLUCOSE SERPL-MCNC: 86 MG/DL (ref 70–110)
HCT VFR BLD AUTO: 39.9 % (ref 37–48.5)
HETEROPH AB BLD QL IA: NEGATIVE
HGB BLD-MCNC: 13.8 G/DL (ref 12–16)
IMM GRANULOCYTES # BLD AUTO: 0.01 K/UL (ref 0–0.04)
IMM GRANULOCYTES NFR BLD AUTO: 0.2 % (ref 0–0.5)
INFLUENZA A, MOLECULAR: NEGATIVE
INFLUENZA B, MOLECULAR: NEGATIVE
LYMPHOCYTES # BLD AUTO: 1.3 K/UL (ref 1–4.8)
LYMPHOCYTES NFR BLD: 26.9 % (ref 18–48)
MCH RBC QN AUTO: 33.7 PG (ref 27–31)
MCHC RBC AUTO-ENTMCNC: 34.6 G/DL (ref 32–36)
MCV RBC AUTO: 98 FL (ref 82–98)
MONOCYTES # BLD AUTO: 0.6 K/UL (ref 0.3–1)
MONOCYTES NFR BLD: 12.7 % (ref 4–15)
NEUTROPHILS # BLD AUTO: 2.8 K/UL (ref 1.8–7.7)
NEUTROPHILS NFR BLD: 57.6 % (ref 38–73)
NRBC BLD-RTO: 0 /100 WBC
PLATELET # BLD AUTO: 207 K/UL (ref 150–450)
PMV BLD AUTO: 10.1 FL (ref 9.2–12.9)
POTASSIUM SERPL-SCNC: 3.9 MMOL/L (ref 3.5–5.1)
PROT SERPL-MCNC: 6.8 G/DL (ref 6–8.4)
RBC # BLD AUTO: 4.09 M/UL (ref 4–5.4)
SARS-COV-2 RDRP RESP QL NAA+PROBE: NEGATIVE
SODIUM SERPL-SCNC: 138 MMOL/L (ref 136–145)
SPECIMEN SOURCE: NORMAL
WBC # BLD AUTO: 4.87 K/UL (ref 3.9–12.7)

## 2022-10-20 PROCEDURE — 87502 INFLUENZA DNA AMP PROBE: CPT | Performed by: NURSE PRACTITIONER

## 2022-10-20 PROCEDURE — 85025 COMPLETE CBC W/AUTO DIFF WBC: CPT | Performed by: NURSE PRACTITIONER

## 2022-10-20 PROCEDURE — U0002 COVID-19 LAB TEST NON-CDC: HCPCS | Performed by: NURSE PRACTITIONER

## 2022-10-20 PROCEDURE — 80053 COMPREHEN METABOLIC PANEL: CPT | Performed by: NURSE PRACTITIONER

## 2022-10-20 PROCEDURE — 87502 INFLUENZA DNA AMP PROBE: CPT

## 2022-10-20 PROCEDURE — 36415 COLL VENOUS BLD VENIPUNCTURE: CPT | Performed by: NURSE PRACTITIONER

## 2022-10-20 PROCEDURE — 99283 EMERGENCY DEPT VISIT LOW MDM: CPT | Mod: 25

## 2022-10-20 PROCEDURE — 86308 HETEROPHILE ANTIBODY SCREEN: CPT | Performed by: NURSE PRACTITIONER

## 2022-10-20 RX ORDER — AMOXICILLIN 875 MG/1
875 TABLET, FILM COATED ORAL 2 TIMES DAILY
Qty: 14 TABLET | Refills: 0 | Status: SHIPPED | OUTPATIENT
Start: 2022-10-20

## 2022-10-20 NOTE — ED PROVIDER NOTES
Encounter Date: 10/20/2022       History     Chief Complaint   Patient presents with    Fatigue     Weakness, body aches for 2 days. Also reports nose bleed      2 day history malaise, myalgias, headache, swollen glands in neck. Does not think she has had any fever. Generally does not feel well. States had a nosebleed earlier.     Review of patient's allergies indicates:  No Known Allergies  Past Medical History:   Diagnosis Date    Anxiety     Depression      Past Surgical History:   Procedure Laterality Date    APPENDECTOMY      BREAST CYST EXCISION Left     DILATION AND CURETTAGE OF UTERUS      FINGER AMPUTATION Left 3/12/2020    Procedure: AMPUTATION, FINGER;  Surgeon: Lucian Fairchild DO;  Location: [facility] OR;  Service: Orthopedics;  Laterality: Left;  Amputation of the Distal Phalanx, Left Small Finger      HYSTERECTOMY      OPEN REDUCTION AND INTERNAL FIXATION (ORIF) OF INJURY OF FINGER Left 9/26/2019    Procedure: ORIF, FINGER;  Surgeon: Lucian Fairchild DO;  Location: [facility] OR;  Service: Orthopedics;  Laterality: Left;  Equipment: [employer] Mini Frag Set/ Hand Set  Vendor/Rep: [employer]  C-Arm: Entire  REQUIRES FIRST ASSISTANT ITZEL      TENOTOMY Left 3/12/2020    Procedure: TENOTOMY;  Surgeon: Lucian Fairchild DO;  Location: [facility] OR;  Service: Orthopedics;  Laterality: Left;     Family History   Problem Relation Age of Onset    No Known Problems Mother     No Known Problems Father     No Known Problems Sister     No Known Problems Brother     No Known Problems Son     No Known Problems Daughter     No Known Problems Daughter      Social History     Tobacco Use    Smoking status: Every Day     Packs/day: 1.00     Years: 15.00     Pack years: 15.00     Types: Cigarettes    Smokeless tobacco: Never   Substance Use Topics    Alcohol use: Yes     Comment: occ    Drug use: Yes     Types: Marijuana     Review of Systems   Constitutional:  Negative for fever.   HENT:  Positive for congestion and nosebleeds. Negative  for sore throat.    Respiratory:  Negative for shortness of breath.    Cardiovascular:  Negative for chest pain.   Gastrointestinal:  Negative for nausea.   Genitourinary:  Negative for dysuria.   Musculoskeletal:  Positive for myalgias. Negative for back pain.   Skin:  Negative for rash.   Neurological:  Negative for weakness.   Hematological:  Does not bruise/bleed easily.     Physical Exam     Initial Vitals [10/20/22 1153]   BP Pulse Resp Temp SpO2   (!) 139/97 79 18 97.8 °F (36.6 °C) 100 %      MAP       --         Physical Exam    Nursing note and vitals reviewed.  Constitutional: She appears well-developed and well-nourished.   HENT:   Head: Normocephalic and atraumatic.   TM's full/dull bilaterally. + frontal and maxillary tenderness to palpation. + purulent PND   Eyes: EOM are normal.   Neck: Neck supple.   Posterior cervical lymphadenopathy   Cardiovascular:  Normal rate and regular rhythm.           Pulmonary/Chest: Breath sounds normal. She has no wheezes.   Abdominal: Abdomen is soft. There is no abdominal tenderness.   Musculoskeletal:      Cervical back: Neck supple.     Neurological: She is alert and oriented to person, place, and time.   Skin: Skin is warm and dry. Capillary refill takes less than 2 seconds.   Psychiatric: She has a normal mood and affect.       ED Course   Procedures  Labs Reviewed   CBC W/ AUTO DIFFERENTIAL - Abnormal; Notable for the following components:       Result Value    MCH 33.7 (*)     All other components within normal limits   COMPREHENSIVE METABOLIC PANEL - Abnormal; Notable for the following components:    CO2 22 (*)     Calcium 8.5 (*)     All other components within normal limits   INFLUENZA A & B BY MOLECULAR   SARS-COV-2 RNA AMPLIFICATION, QUAL    Narrative:     Is the patient symptomatic?->Yes   HETEROPHILE AB SCREEN          Imaging Results    None          Medications - No data to display                           Clinical Impression:   Final  diagnoses:  [J01.90, B96.89] Acute bacterial sinusitis (Primary)      ED Disposition Condition    Discharge Good          ED Prescriptions       Medication Sig Dispense Start Date End Date Auth. Provider    amoxicillin (AMOXIL) 875 MG tablet Take 1 tablet (875 mg total) by mouth 2 (two) times daily. 14 tablet 10/20/2022 -- PEPE Vinson          Follow-up Information       Follow up With Specialties Details Why Contact Info    PCP   As needed              PEPE Vinson  10/20/22 1311

## 2024-04-25 ENCOUNTER — HOSPITAL ENCOUNTER (EMERGENCY)
Facility: HOSPITAL | Age: 39
Discharge: HOME | End: 2024-04-25
Attending: STUDENT IN AN ORGANIZED HEALTH CARE EDUCATION/TRAINING PROGRAM

## 2024-04-25 VITALS
HEART RATE: 67 BPM | TEMPERATURE: 98 F | BODY MASS INDEX: 23.82 KG/M2 | RESPIRATION RATE: 16 BRPM | OXYGEN SATURATION: 100 % | WEIGHT: 143 LBS | HEIGHT: 65 IN | DIASTOLIC BLOOD PRESSURE: 80 MMHG | SYSTOLIC BLOOD PRESSURE: 125 MMHG

## 2024-04-25 DIAGNOSIS — L03.011 CELLULITIS OF FINGER OF RIGHT HAND: Primary | ICD-10-CM

## 2024-04-25 LAB
ALBUMIN SERPL BCP-MCNC: 3.5 G/DL (ref 3.5–5.2)
ALP SERPL-CCNC: 89 U/L (ref 55–135)
ALT SERPL W/O P-5'-P-CCNC: 26 U/L (ref 10–44)
ANION GAP SERPL CALC-SCNC: 12 MMOL/L (ref 8–16)
AST SERPL-CCNC: 28 U/L (ref 10–40)
BASOPHILS # BLD AUTO: 0.08 K/UL (ref 0–0.2)
BASOPHILS NFR BLD: 0.9 % (ref 0–1.9)
BILIRUB SERPL-MCNC: 0.3 MG/DL (ref 0.1–1)
BUN SERPL-MCNC: 26 MG/DL (ref 6–20)
CALCIUM SERPL-MCNC: 9.1 MG/DL (ref 8.7–10.5)
CHLORIDE SERPL-SCNC: 101 MMOL/L (ref 95–110)
CO2 SERPL-SCNC: 24 MMOL/L (ref 23–29)
CREAT SERPL-MCNC: 0.8 MG/DL (ref 0.5–1.4)
CRP SERPL-MCNC: 20 MG/L (ref 0–8.2)
DIFFERENTIAL METHOD BLD: ABNORMAL
EOSINOPHIL # BLD AUTO: 0.5 K/UL (ref 0–0.5)
EOSINOPHIL NFR BLD: 5.8 % (ref 0–8)
ERYTHROCYTE [DISTWIDTH] IN BLOOD BY AUTOMATED COUNT: 13.1 % (ref 11.5–14.5)
ERYTHROCYTE [SEDIMENTATION RATE] IN BLOOD BY WESTERGREN METHOD: 15 MM/HR (ref 0–20)
EST. GFR  (NO RACE VARIABLE): >60 ML/MIN/1.73 M^2
GLUCOSE SERPL-MCNC: 95 MG/DL (ref 70–110)
HCT VFR BLD AUTO: 39.3 % (ref 37–48.5)
HCV AB SERPL QL IA: NORMAL
HGB BLD-MCNC: 13.3 G/DL (ref 12–16)
HIV 1+2 AB+HIV1 P24 AG SERPL QL IA: NORMAL
IMM GRANULOCYTES # BLD AUTO: 0.02 K/UL (ref 0–0.04)
IMM GRANULOCYTES NFR BLD AUTO: 0.2 % (ref 0–0.5)
LYMPHOCYTES # BLD AUTO: 1.9 K/UL (ref 1–4.8)
LYMPHOCYTES NFR BLD: 20.7 % (ref 18–48)
MCH RBC QN AUTO: 32.8 PG (ref 27–31)
MCHC RBC AUTO-ENTMCNC: 33.8 G/DL (ref 32–36)
MCV RBC AUTO: 97 FL (ref 82–98)
MONOCYTES # BLD AUTO: 0.8 K/UL (ref 0.3–1)
MONOCYTES NFR BLD: 8.6 % (ref 4–15)
NEUTROPHILS # BLD AUTO: 5.8 K/UL (ref 1.8–7.7)
NEUTROPHILS NFR BLD: 63.8 % (ref 38–73)
NRBC BLD-RTO: 0 /100 WBC
PLATELET # BLD AUTO: 291 K/UL (ref 150–450)
PMV BLD AUTO: 9 FL (ref 9.2–12.9)
POTASSIUM SERPL-SCNC: 3.5 MMOL/L (ref 3.5–5.1)
PROT SERPL-MCNC: 7.3 G/DL (ref 6–8.4)
RBC # BLD AUTO: 4.05 M/UL (ref 4–5.4)
SODIUM SERPL-SCNC: 137 MMOL/L (ref 136–145)
WBC # BLD AUTO: 9.1 K/UL (ref 3.9–12.7)

## 2024-04-25 PROCEDURE — 96365 THER/PROPH/DIAG IV INF INIT: CPT

## 2024-04-25 PROCEDURE — 63600175 PHARM REV CODE 636 W HCPCS: Performed by: NURSE PRACTITIONER

## 2024-04-25 PROCEDURE — 96361 HYDRATE IV INFUSION ADD-ON: CPT

## 2024-04-25 PROCEDURE — 80053 COMPREHEN METABOLIC PANEL: CPT | Performed by: NURSE PRACTITIONER

## 2024-04-25 PROCEDURE — 85651 RBC SED RATE NONAUTOMATED: CPT | Performed by: NURSE PRACTITIONER

## 2024-04-25 PROCEDURE — 90715 TDAP VACCINE 7 YRS/> IM: CPT | Performed by: NURSE PRACTITIONER

## 2024-04-25 PROCEDURE — 73140 X-RAY EXAM OF FINGER(S): CPT | Mod: TC,RT

## 2024-04-25 PROCEDURE — 87040 BLOOD CULTURE FOR BACTERIA: CPT | Performed by: NURSE PRACTITIONER

## 2024-04-25 PROCEDURE — 73140 X-RAY EXAM OF FINGER(S): CPT | Mod: 26,RT,, | Performed by: RADIOLOGY

## 2024-04-25 PROCEDURE — 96375 TX/PRO/DX INJ NEW DRUG ADDON: CPT

## 2024-04-25 PROCEDURE — 85025 COMPLETE CBC W/AUTO DIFF WBC: CPT | Performed by: NURSE PRACTITIONER

## 2024-04-25 PROCEDURE — 63600175 PHARM REV CODE 636 W HCPCS: Mod: JZ,JG | Performed by: NURSE PRACTITIONER

## 2024-04-25 PROCEDURE — 86803 HEPATITIS C AB TEST: CPT | Performed by: STUDENT IN AN ORGANIZED HEALTH CARE EDUCATION/TRAINING PROGRAM

## 2024-04-25 PROCEDURE — 86140 C-REACTIVE PROTEIN: CPT | Performed by: NURSE PRACTITIONER

## 2024-04-25 PROCEDURE — 87389 HIV-1 AG W/HIV-1&-2 AB AG IA: CPT | Performed by: STUDENT IN AN ORGANIZED HEALTH CARE EDUCATION/TRAINING PROGRAM

## 2024-04-25 PROCEDURE — 99284 EMERGENCY DEPT VISIT MOD MDM: CPT | Mod: 25

## 2024-04-25 PROCEDURE — 90471 IMMUNIZATION ADMIN: CPT | Performed by: NURSE PRACTITIONER

## 2024-04-25 PROCEDURE — 25000003 PHARM REV CODE 250: Performed by: NURSE PRACTITIONER

## 2024-04-25 RX ORDER — ONDANSETRON 4 MG/1
4 TABLET, FILM COATED ORAL EVERY 6 HOURS PRN
Qty: 30 TABLET | Refills: 0 | Status: SHIPPED | OUTPATIENT
Start: 2024-04-25

## 2024-04-25 RX ORDER — KETOROLAC TROMETHAMINE 30 MG/ML
30 INJECTION, SOLUTION INTRAMUSCULAR; INTRAVENOUS
Status: COMPLETED | OUTPATIENT
Start: 2024-04-25 | End: 2024-04-25

## 2024-04-25 RX ORDER — CLINDAMYCIN PHOSPHATE 600 MG/50ML
600 INJECTION, SOLUTION INTRAVENOUS
Status: COMPLETED | OUTPATIENT
Start: 2024-04-25 | End: 2024-04-25

## 2024-04-25 RX ORDER — CLINDAMYCIN HYDROCHLORIDE 300 MG/1
300 CAPSULE ORAL EVERY 6 HOURS
Qty: 40 CAPSULE | Refills: 0 | Status: ON HOLD | OUTPATIENT
Start: 2024-04-25 | End: 2024-04-30

## 2024-04-25 RX ADMIN — TETANUS TOXOID, REDUCED DIPHTHERIA TOXOID AND ACELLULAR PERTUSSIS VACCINE, ADSORBED 0.5 ML: 5; 2.5; 8; 8; 2.5 SUSPENSION INTRAMUSCULAR at 10:04

## 2024-04-25 RX ADMIN — SODIUM CHLORIDE 1000 ML: 9 INJECTION, SOLUTION INTRAVENOUS at 10:04

## 2024-04-25 RX ADMIN — CLINDAMYCIN PHOSPHATE 600 MG: 600 INJECTION, SOLUTION INTRAVENOUS at 10:04

## 2024-04-25 RX ADMIN — KETOROLAC TROMETHAMINE 30 MG: 30 INJECTION, SOLUTION INTRAMUSCULAR at 12:04

## 2024-04-25 NOTE — DISCHARGE INSTRUCTIONS
Rest, increase fluids, lots of water and liquids.  Tylenol and or Motrin as needed.  Call office for recheck return as needed.  Keep your fingers clean and dry

## 2024-04-25 NOTE — ED PROVIDER NOTES
Encounter Date: 4/25/2024       History     Chief Complaint   Patient presents with    Hand Pain     Reports right hand 3rd digit pain / swelling x 4 days with red streak going up arm.      POV to ED alone.  Patient complains of redness and swelling to the right middle finger.  States that she sustained an accidental laceration to the right middle finger with her dirty pocket knife 7-8 days ago.  Onset of redness and swelling 4-5 days ago.  States she tried to open the cut back up and see if it would drain anything, it did not drain. Tetanus unknown.  Denies fever vomiting.  States she works with her hands, they always stay dirty. No other complaints. Cocaine and etoh use, last use of cocaine this week. Denies IV drug injection into her fingers    The history is provided by the patient.     Review of patient's allergies indicates:  No Known Allergies  Past Medical History:   Diagnosis Date    Anxiety     Depression      Past Surgical History:   Procedure Laterality Date    APPENDECTOMY      BREAST CYST EXCISION Left     DILATION AND CURETTAGE OF UTERUS      FINGER AMPUTATION Left 3/12/2020    Procedure: AMPUTATION, FINGER;  Surgeon: Lucian Fairchild DO;  Location: [facility] OR;  Service: Orthopedics;  Laterality: Left;  Amputation of the Distal Phalanx, Left Small Finger      HYSTERECTOMY      OPEN REDUCTION AND INTERNAL FIXATION (ORIF) OF INJURY OF FINGER Left 9/26/2019    Procedure: ORIF, FINGER;  Surgeon: Lucian Fairchild DO;  Location: [facility] OR;  Service: Orthopedics;  Laterality: Left;  Equipment: [employer] Mini Frag Set/ Hand Set  Vendor/Rep: [employer]  C-Arm: Entire  REQUIRES FIRST ASSISTANT ITZEL      TENOTOMY Left 3/12/2020    Procedure: TENOTOMY;  Surgeon: Lucian Fairchild DO;  Location: [facility] OR;  Service: Orthopedics;  Laterality: Left;     Family History   Problem Relation Name Age of Onset    No Known Problems Mother      No Known Problems Father      No Known Problems Sister      No Known Problems Brother       No Known Problems Son      No Known Problems Daughter      No Known Problems Daughter       Social History     Tobacco Use    Smoking status: Every Day     Current packs/day: 1.00     Average packs/day: 1 pack/day for 15.0 years (15.0 ttl pk-yrs)     Types: Cigarettes    Smokeless tobacco: Never   Substance Use Topics    Alcohol use: Yes     Comment: occ    Drug use: Yes     Types: Marijuana     Review of Systems   Constitutional:  Negative for chills and fever.   Musculoskeletal:         Unrepaired laceration right middle finger, redness, swelling   All other systems reviewed and are negative.      Physical Exam     Initial Vitals [04/25/24 0953]   BP Pulse Resp Temp SpO2   (!) 149/68 95 16 97.9 °F (36.6 °C) 100 %      MAP       --         Physical Exam    Nursing note and vitals reviewed.  Constitutional: She appears well-developed and well-nourished. No distress.   HENT:   Head: Normocephalic and atraumatic.   Mouth/Throat: Oropharynx is clear and moist.   Eyes: Pupils are equal, round, and reactive to light.   Neck:   Normal range of motion.  Cardiovascular:  Normal rate and regular rhythm.           Pulmonary/Chest: Breath sounds normal. No respiratory distress.   Abdominal: Abdomen is soft. There is no abdominal tenderness.   Musculoskeletal:      Cervical back: Normal range of motion.      Comments: Soft tissue swelling right middle finger diffusely. Mild Erythema. ROM intact, limited due to pain. No abscess. Erythema streaking into her right upper arm.  No lymphadenopathy is noted.  No sign of tendon injury or pain     Neurological: She is alert and oriented to person, place, and time. She has normal strength. GCS score is 15. GCS eye subscore is 4. GCS verbal subscore is 5. GCS motor subscore is 6.   Skin: Skin is warm and dry. Capillary refill takes less than 2 seconds.   Psychiatric: She has a normal mood and affect. Thought content normal.         ED Course   Procedures  Labs Reviewed   CBC W/ AUTO  DIFFERENTIAL - Abnormal; Notable for the following components:       Result Value    MCH 32.8 (*)     MPV 9.0 (*)     All other components within normal limits    Narrative:     Release to patient->Immediate   COMPREHENSIVE METABOLIC PANEL - Abnormal; Notable for the following components:    BUN 26 (*)     All other components within normal limits    Narrative:     Release to patient->Immediate   C-REACTIVE PROTEIN - Abnormal; Notable for the following components:    CRP 20.0 (*)     All other components within normal limits    Narrative:     Release to patient->Immediate   CULTURE, BLOOD   CULTURE, BLOOD   HIV 1 / 2 ANTIBODY   HEPATITIS C ANTIBODY   SEDIMENTATION RATE          Imaging Results              X-Ray Finger 2 or More Views Right (Final result)  Result time 04/25/24 10:39:53      Final result by Skyla Coulter MD (04/25/24 10:39:53)                   Impression:      Soft tissue swelling, otherwise negative study.      Electronically signed by: Skyla Coulter  Date:    04/25/2024  Time:    10:39               Narrative:    EXAMINATION:  XR FINGER 2 OR MORE VIEWS RIGHT    CLINICAL HISTORY:  right middle finger swelling;    TECHNIQUE:  Three views of the right hand were obtained    COMPARISON:  None    FINDINGS:  There is soft tissue swelling throughout the right middle finger centered at the PIP joint.  There is no fracture, dislocation, bone destruction or periosteal elevation.  No radiopaque foreign body is present.                                    X-Rays:   Independently Interpreted Readings:   Other Readings:  EXAMINATION:  XR FINGER 2 OR MORE VIEWS RIGHT     CLINICAL HISTORY:  right middle finger swelling;     TECHNIQUE:  Three views of the right hand were obtained     COMPARISON:  None     FINDINGS:  There is soft tissue swelling throughout the right middle finger centered at the PIP joint.  There is no fracture, dislocation, bone destruction or periosteal elevation.  No radiopaque  foreign body is present.     Impression:     Soft tissue swelling, otherwise negative study.      Medications   ketorolac injection 30 mg (has no administration in time range)   sodium chloride 0.9% bolus 1,000 mL 1,000 mL (1,000 mLs Intravenous New Bag 4/25/24 1037)   clindamycin in D5W 600 mg/50 mL IVPB 600 mg (0 mg Intravenous Stopped 4/25/24 1125)   Tdap (BOOSTRIX) vaccine injection 0.5 mL (0.5 mLs Intramuscular Given 4/25/24 1038)     Medical Decision Making  Presents for evaluation of swelling and tenderness to the right middle finger after an unrepaired laceration.  X-ray ordered, labs ordered.  Disposition pending  Differentials include limited to dehydration, volume depletion, abnormal electrolytes, anemia, fracture, sprain, strain, contusion, foreign body, tenosynovitis  Discharged home, diagnosis cellulitis.  X-ray negative for fracture or foreign body.  Reporting no fever.  Normal WBC.  Clindamycin IV, normal saline IV bolus.  Clinic referral information, prescriptions for home use.  Return as needed.  She agrees with plan of care. Discussed with Dr Martin, sees patient in the ED    Amount and/or Complexity of Data Reviewed  Labs: ordered. Decision-making details documented in ED Course.  Radiology: ordered. Decision-making details documented in ED Course.    Risk  OTC drugs.  Prescription drug management.               ED Course as of 04/25/24 1209   Thu Apr 25, 2024   1201    C-reactive protein    Final resultCollected 04/25 1036    CRP 20.0    Complete Blood Count (CBC)    Final resultCollected 04/25 1036    MCH 32.8  MPV 9.0   Comprehensive Metabolic Panel (CMP)    Final resultCollected 04/25 1036    BUN 26         [CP]      ED Course User Index  [CP] Dasia Gonzalez NP                           Clinical Impression:  Final diagnoses:  [L03.011] Cellulitis of finger of right hand - PALMAR SURFACE DISTAL PHALANX RIGHT MIDDLE FINGER (Primary)         ED Disposition Condition    Discharge Stable           ED Prescriptions       Medication Sig Dispense Start Date End Date Auth. Provider    clindamycin (CLEOCIN) 300 MG capsule Take 1 capsule (300 mg total) by mouth every 6 (six) hours. for 10 days 40 capsule 4/25/2024 5/5/2024 Dasia Gonzalez NP    ondansetron (ZOFRAN) 4 MG tablet Take 1 tablet (4 mg total) by mouth every 6 (six) hours as needed for Nausea. 30 tablet 4/25/2024 -- Dasia Gonzalez NP          Follow-up Information       Follow up With Specialties Details Why Contact Info    Dane Jaramillo MD Orthopedic Surgery Call in 3 days  [address] [facility] [place] [address] [phone]      Count includes the [facility]  Call in 3 days               Dasia Gonzalez NP  04/25/24 1201       Dasia Gonzalez NP  04/25/24 1205       Dsaia Gonzalez NP  04/25/24 1209       Dasia Gonzalez NP  04/25/24 1209

## 2024-04-28 ENCOUNTER — HOSPITAL ENCOUNTER (OUTPATIENT)
Facility: HOSPITAL | Age: 39
Discharge: HOME | End: 2024-04-30
Attending: STUDENT IN AN ORGANIZED HEALTH CARE EDUCATION/TRAINING PROGRAM | Admitting: INTERNAL MEDICINE

## 2024-04-28 DIAGNOSIS — L03.011 CELLULITIS OF FINGER OF RIGHT HAND: ICD-10-CM

## 2024-04-28 DIAGNOSIS — L03.113 CELLULITIS OF HAND, RIGHT: Primary | ICD-10-CM

## 2024-04-28 PROBLEM — Z72.0 TOBACCO ABUSE: Status: ACTIVE | Noted: 2024-04-28

## 2024-04-28 LAB
ALBUMIN SERPL BCP-MCNC: 3.4 G/DL (ref 3.5–5.2)
ALP SERPL-CCNC: 98 U/L (ref 55–135)
ALT SERPL W/O P-5'-P-CCNC: 34 U/L (ref 10–44)
ANION GAP SERPL CALC-SCNC: 10 MMOL/L (ref 8–16)
AST SERPL-CCNC: 24 U/L (ref 10–40)
BASOPHILS # BLD AUTO: 0.08 K/UL (ref 0–0.2)
BASOPHILS NFR BLD: 0.8 % (ref 0–1.9)
BILIRUB SERPL-MCNC: 0.3 MG/DL (ref 0.1–1)
BUN SERPL-MCNC: 12 MG/DL (ref 6–20)
CALCIUM SERPL-MCNC: 9 MG/DL (ref 8.7–10.5)
CHLORIDE SERPL-SCNC: 102 MMOL/L (ref 95–110)
CO2 SERPL-SCNC: 25 MMOL/L (ref 23–29)
CREAT SERPL-MCNC: 0.8 MG/DL (ref 0.5–1.4)
CRP SERPL-MCNC: 46.2 MG/L (ref 0–8.2)
DIFFERENTIAL METHOD BLD: ABNORMAL
EOSINOPHIL # BLD AUTO: 0.6 K/UL (ref 0–0.5)
EOSINOPHIL NFR BLD: 6.2 % (ref 0–8)
ERYTHROCYTE [DISTWIDTH] IN BLOOD BY AUTOMATED COUNT: 13 % (ref 11.5–14.5)
ERYTHROCYTE [SEDIMENTATION RATE] IN BLOOD BY WESTERGREN METHOD: 15 MM/HR (ref 0–20)
EST. GFR  (NO RACE VARIABLE): >60 ML/MIN/1.73 M^2
GLUCOSE SERPL-MCNC: 86 MG/DL (ref 70–110)
HCT VFR BLD AUTO: 37.7 % (ref 37–48.5)
HGB BLD-MCNC: 12.9 G/DL (ref 12–16)
IMM GRANULOCYTES # BLD AUTO: 0.02 K/UL (ref 0–0.04)
IMM GRANULOCYTES NFR BLD AUTO: 0.2 % (ref 0–0.5)
LACTATE SERPL-SCNC: 0.6 MMOL/L (ref 0.5–2.2)
LYMPHOCYTES # BLD AUTO: 1.8 K/UL (ref 1–4.8)
LYMPHOCYTES NFR BLD: 17.2 % (ref 18–48)
MCH RBC QN AUTO: 33 PG (ref 27–31)
MCHC RBC AUTO-ENTMCNC: 34.2 G/DL (ref 32–36)
MCV RBC AUTO: 96 FL (ref 82–98)
MONOCYTES # BLD AUTO: 0.8 K/UL (ref 0.3–1)
MONOCYTES NFR BLD: 7.5 % (ref 4–15)
NEUTROPHILS # BLD AUTO: 7 K/UL (ref 1.8–7.7)
NEUTROPHILS NFR BLD: 68.1 % (ref 38–73)
NRBC BLD-RTO: 0 /100 WBC
PLATELET # BLD AUTO: 302 K/UL (ref 150–450)
PMV BLD AUTO: 9 FL (ref 9.2–12.9)
POTASSIUM SERPL-SCNC: 3.6 MMOL/L (ref 3.5–5.1)
PROCALCITONIN SERPL IA-MCNC: <0.02 NG/ML
PROT SERPL-MCNC: 7.3 G/DL (ref 6–8.4)
RBC # BLD AUTO: 3.91 M/UL (ref 4–5.4)
SODIUM SERPL-SCNC: 137 MMOL/L (ref 136–145)
WBC # BLD AUTO: 10.21 K/UL (ref 3.9–12.7)

## 2024-04-28 PROCEDURE — 96367 TX/PROPH/DG ADDL SEQ IV INF: CPT

## 2024-04-28 PROCEDURE — 25000003 PHARM REV CODE 250: Performed by: INTERNAL MEDICINE

## 2024-04-28 PROCEDURE — 63600175 PHARM REV CODE 636 W HCPCS: Performed by: STUDENT IN AN ORGANIZED HEALTH CARE EDUCATION/TRAINING PROGRAM

## 2024-04-28 PROCEDURE — G0378 HOSPITAL OBSERVATION PER HR: HCPCS

## 2024-04-28 PROCEDURE — 84145 PROCALCITONIN (PCT): CPT | Performed by: STUDENT IN AN ORGANIZED HEALTH CARE EDUCATION/TRAINING PROGRAM

## 2024-04-28 PROCEDURE — 80053 COMPREHEN METABOLIC PANEL: CPT | Performed by: STUDENT IN AN ORGANIZED HEALTH CARE EDUCATION/TRAINING PROGRAM

## 2024-04-28 PROCEDURE — 96372 THER/PROPH/DIAG INJ SC/IM: CPT | Performed by: INTERNAL MEDICINE

## 2024-04-28 PROCEDURE — 25000003 PHARM REV CODE 250: Performed by: STUDENT IN AN ORGANIZED HEALTH CARE EDUCATION/TRAINING PROGRAM

## 2024-04-28 PROCEDURE — 83605 ASSAY OF LACTIC ACID: CPT | Performed by: STUDENT IN AN ORGANIZED HEALTH CARE EDUCATION/TRAINING PROGRAM

## 2024-04-28 PROCEDURE — 85025 COMPLETE CBC W/AUTO DIFF WBC: CPT | Performed by: STUDENT IN AN ORGANIZED HEALTH CARE EDUCATION/TRAINING PROGRAM

## 2024-04-28 PROCEDURE — 99285 EMERGENCY DEPT VISIT HI MDM: CPT | Mod: 25

## 2024-04-28 PROCEDURE — 85651 RBC SED RATE NONAUTOMATED: CPT | Performed by: STUDENT IN AN ORGANIZED HEALTH CARE EDUCATION/TRAINING PROGRAM

## 2024-04-28 PROCEDURE — 73130 X-RAY EXAM OF HAND: CPT | Mod: 26,RT,, | Performed by: RADIOLOGY

## 2024-04-28 PROCEDURE — 96366 THER/PROPH/DIAG IV INF ADDON: CPT

## 2024-04-28 PROCEDURE — 99223 1ST HOSP IP/OBS HIGH 75: CPT | Mod: ,,, | Performed by: INTERNAL MEDICINE

## 2024-04-28 PROCEDURE — 96365 THER/PROPH/DIAG IV INF INIT: CPT

## 2024-04-28 PROCEDURE — 36415 COLL VENOUS BLD VENIPUNCTURE: CPT | Performed by: INTERNAL MEDICINE

## 2024-04-28 PROCEDURE — 87040 BLOOD CULTURE FOR BACTERIA: CPT | Performed by: INTERNAL MEDICINE

## 2024-04-28 PROCEDURE — 86140 C-REACTIVE PROTEIN: CPT | Performed by: STUDENT IN AN ORGANIZED HEALTH CARE EDUCATION/TRAINING PROGRAM

## 2024-04-28 PROCEDURE — 73130 X-RAY EXAM OF HAND: CPT | Mod: TC,RT

## 2024-04-28 PROCEDURE — 63600175 PHARM REV CODE 636 W HCPCS: Performed by: INTERNAL MEDICINE

## 2024-04-28 RX ORDER — SODIUM CHLORIDE 0.9 % (FLUSH) 0.9 %
10 SYRINGE (ML) INJECTION EVERY 12 HOURS PRN
Status: DISCONTINUED | OUTPATIENT
Start: 2024-04-28 | End: 2024-04-30 | Stop reason: HOSPADM

## 2024-04-28 RX ORDER — CIPROFLOXACIN 2 MG/ML
400 INJECTION, SOLUTION INTRAVENOUS
Status: DISCONTINUED | OUTPATIENT
Start: 2024-04-28 | End: 2024-04-30 | Stop reason: HOSPADM

## 2024-04-28 RX ORDER — ONDANSETRON HYDROCHLORIDE 2 MG/ML
4 INJECTION, SOLUTION INTRAVENOUS EVERY 8 HOURS PRN
Status: DISCONTINUED | OUTPATIENT
Start: 2024-04-28 | End: 2024-04-30 | Stop reason: HOSPADM

## 2024-04-28 RX ORDER — PROCHLORPERAZINE EDISYLATE 5 MG/ML
5 INJECTION INTRAMUSCULAR; INTRAVENOUS EVERY 6 HOURS PRN
Status: DISCONTINUED | OUTPATIENT
Start: 2024-04-28 | End: 2024-04-30 | Stop reason: HOSPADM

## 2024-04-28 RX ORDER — OXYCODONE AND ACETAMINOPHEN 10; 325 MG/1; MG/1
1 TABLET ORAL EVERY 6 HOURS PRN
Status: DISCONTINUED | OUTPATIENT
Start: 2024-04-28 | End: 2024-04-30 | Stop reason: HOSPADM

## 2024-04-28 RX ORDER — ACETAMINOPHEN 325 MG/1
650 TABLET ORAL EVERY 4 HOURS PRN
Status: DISCONTINUED | OUTPATIENT
Start: 2024-04-28 | End: 2024-04-30 | Stop reason: HOSPADM

## 2024-04-28 RX ORDER — TALC
6 POWDER (GRAM) TOPICAL NIGHTLY PRN
Status: DISCONTINUED | OUTPATIENT
Start: 2024-04-28 | End: 2024-04-30 | Stop reason: HOSPADM

## 2024-04-28 RX ORDER — ENOXAPARIN SODIUM 100 MG/ML
40 INJECTION SUBCUTANEOUS EVERY 24 HOURS
Status: DISCONTINUED | OUTPATIENT
Start: 2024-04-28 | End: 2024-04-30 | Stop reason: HOSPADM

## 2024-04-28 RX ORDER — ONDANSETRON HYDROCHLORIDE 2 MG/ML
4 INJECTION, SOLUTION INTRAVENOUS EVERY 6 HOURS PRN
Status: DISCONTINUED | OUTPATIENT
Start: 2024-04-28 | End: 2024-04-30 | Stop reason: HOSPADM

## 2024-04-28 RX ORDER — MORPHINE SULFATE 2 MG/ML
2 INJECTION, SOLUTION INTRAMUSCULAR; INTRAVENOUS EVERY 4 HOURS PRN
Status: DISCONTINUED | OUTPATIENT
Start: 2024-04-28 | End: 2024-04-30 | Stop reason: HOSPADM

## 2024-04-28 RX ADMIN — CIPROFLOXACIN 400 MG: 2 INJECTION, SOLUTION INTRAVENOUS at 02:04

## 2024-04-28 RX ADMIN — ENOXAPARIN SODIUM 40 MG: 40 INJECTION SUBCUTANEOUS at 05:04

## 2024-04-28 RX ADMIN — VANCOMYCIN HYDROCHLORIDE 1000 MG: 1 INJECTION, POWDER, LYOPHILIZED, FOR SOLUTION INTRAVENOUS at 11:04

## 2024-04-28 RX ADMIN — OXYCODONE HYDROCHLORIDE AND ACETAMINOPHEN 1 TABLET: 10; 325 TABLET ORAL at 09:04

## 2024-04-28 RX ADMIN — VANCOMYCIN HYDROCHLORIDE 1250 MG: 1.25 INJECTION, POWDER, LYOPHILIZED, FOR SOLUTION INTRAVENOUS at 12:04

## 2024-04-28 NOTE — H&P
[facility]  History & Physical    Patient Name: Anna Brock  MRN: [id number]  Patient Class: OP- Observation  Admission Date: 4/28/2024  Attending Physician: Arnaldo Harrell MD  Primary Care Provider: Amelia Primary Doctor         Patient information was obtained from patient and ER records.   Patient has failed outpatient treatment.  Patient was seen here in the emergency department 3 days ago given clindamycin which has failed.  Subjective:     Principal Problem:Cellulitis of hand, right    Chief Complaint:   Chief Complaint   Patient presents with    Cellulitis     Patient with significant cellulitis to the rt hand . Red streaking to rt axilla. Seen in ER placed on Abx x 4 days ago .        HPI:        Chief Complaint   Patient presents with    Cellulitis       Patient with significant cellulitis to the rt hand . Red streaking to rt axilla. Seen in ER placed on Abx x 4 days ago .      38-year-old female with history of depression, anxiety returns to the ED with complaints of worsening cellulitis to right 3rd finger.  She reports that she noticed increased surrounding erythema yesterday. She was seen here 3 days ago for the same, and was subsequently discharged with a prescription for Clindamycin for which she tells me she has been compliant.  Initial injury was an accident laceration with pocket knife, which got infected, and she decided to open the skin herself to facilitate drainage but apparently there was no drainage output.         Review of patient's allergies indicates:  No Known Allergies       Past Medical History:   Diagnosis Date    Anxiety      Depression     Patient denies any other past medical history.  Patient denies fever chills nausea or vomiting.  Patient denies any injury and denies any known bite sites.    Past Medical History:   Diagnosis Date    Anxiety     Depression        Past Surgical History:   Procedure Laterality Date    APPENDECTOMY      BREAST CYST  EXCISION Left     DILATION AND CURETTAGE OF UTERUS      FINGER AMPUTATION Left 3/12/2020    Procedure: AMPUTATION, FINGER;  Surgeon: Lucian Fairchild DO;  Location: [facility] OR;  Service: Orthopedics;  Laterality: Left;  Amputation of the Distal Phalanx, Left Small Finger      HYSTERECTOMY      OPEN REDUCTION AND INTERNAL FIXATION (ORIF) OF INJURY OF FINGER Left 9/26/2019    Procedure: ORIF, FINGER;  Surgeon: Lucian Fairchild DO;  Location: [facility] OR;  Service: Orthopedics;  Laterality: Left;  Equipment: [employer] Mini Frag Set/ Hand Set  Vendor/Rep: [employer]  C-Arm: [employer]  REQUIRES FIRST ASSISTANT ITZEL      TENOTOMY Left 3/12/2020    Procedure: TENOTOMY;  Surgeon: Lucian Fairchild DO;  Location: [facility] OR;  Service: Orthopedics;  Laterality: Left;       Review of patient's allergies indicates:  No Known Allergies    Current Facility-Administered Medications   Medication Dose Route Frequency Provider Last Rate Last Admin    acetaminophen tablet 650 mg  650 mg Oral Q4H PRN Arnaldo Harrell MD        ciprofloxacin (CIPRO)400mg/200ml D5W IVPB 400 mg  400 mg Intravenous Q12H Arnaldo Harrell MD        enoxaparin injection 40 mg  40 mg Subcutaneous Daily Arnaldo Harrell MD        melatonin tablet 6 mg  6 mg Oral Nightly PRN Arnaldo Harrell MD        morphine injection 2 mg  2 mg Intravenous Q4H PRN Arnaldo Harrell MD        ondansetron injection 4 mg  4 mg Intravenous Q6H PRN Arnaldo Harrell MD        ondansetron injection 4 mg  4 mg Intravenous Q8H PRN Arnaldo Harrell MD        prochlorperazine injection Soln 5 mg  5 mg Intravenous Q6H PRN Arnaldo Harrell MD        sodium chloride 0.9% bolus 1,000 mL 1,000 mL  1,000 mL Intravenous ED 1 Time Chauncey Oneill  mL/hr at 04/28/24 1241 1,000 mL at 04/28/24 1241    sodium chloride 0.9% flush 10 mL  10 mL Intravenous Q12H PRN Arnaldo Harrell MD        vancomycin - pharmacy to dose   Intravenous pharmacy to manage frequency Arnaldo Harrell MD         vancomycin 1,250 mg in dextrose 5 % (D5W) 250 mL IVPB (Vial-Mate)  20 mg/kg Intravenous ED 1 Time Chauncey Oneill .7 mL/hr at 04/28/24 1236 1,250 mg at 04/28/24 1236     Current Outpatient Medications   Medication Sig Dispense Refill    amoxicillin (AMOXIL) 875 MG tablet Take 1 tablet (875 mg total) by mouth 2 (two) times daily. 14 tablet 0    clindamycin (CLEOCIN) 300 MG capsule Take 1 capsule (300 mg total) by mouth every 6 (six) hours. for 10 days 40 capsule 0    ondansetron (ZOFRAN) 4 MG tablet Take 1 tablet (4 mg total) by mouth every 6 (six) hours as needed for Nausea. 30 tablet 0     Facility-Administered Medications Ordered in Other Encounters   Medication Dose Route Frequency Provider Last Rate Last Admin    diphenhydrAMINE injection 12.5 mg  12.5 mg Intravenous PRN Sky Ruiz MD        lactated ringers infusion  125 mL/hr Intravenous Continuous Sky Ruiz MD   New Bag at 03/12/20 1409    lidocaine (PF) 10 mg/ml (1%) injection 10 mg  1 mL Intradermal Once Sky Ruiz MD        morphine injection 2 mg  2 mg Intravenous Q5 Min PRN Sky Ruiz MD        ondansetron injection 4 mg  4 mg Intravenous Daily PRN Sky Ruiz MD   4 mg at 03/12/20 1415    promethazine (PHENERGAN) 6.25 mg in dextrose 5 % 50 mL IVPB  6.25 mg Intravenous Q10 Min PRN Sky Ruiz MD         Family History       Problem Relation (Age of Onset)    No Known Problems Mother, Father, Sister, Brother, Son, Daughter, Daughter          Tobacco Use    Smoking status: Every Day     Current packs/day: 1.00     Average packs/day: 1 pack/day for 15.0 years (15.0 ttl pk-yrs)     Types: Cigarettes    Smokeless tobacco: Never   Substance and Sexual Activity    Alcohol use: Yes     Comment: occ    Drug use: Yes     Types: Marijuana    Sexual activity: Yes     Partners: Female     Birth control/protection: See Surgical Hx     Review of Systems   Constitutional:  Negative for activity change, appetite change,  fatigue and fever.   HENT:  Negative for congestion, ear discharge, mouth sores, nosebleeds, rhinorrhea, sinus pressure, sinus pain and tinnitus.    Eyes: Negative.  Negative for pain, redness and itching.   Respiratory:  Negative for apnea, cough, choking, chest tightness, shortness of breath, wheezing and stridor.    Cardiovascular:  Negative for chest pain, palpitations and leg swelling.   Gastrointestinal:  Negative for abdominal distention, abdominal pain, anal bleeding, blood in stool, constipation and diarrhea.   Endocrine: Negative.    Genitourinary:  Negative for difficulty urinating, flank pain, frequency and urgency.   Musculoskeletal:  Positive for arthralgias, joint swelling and myalgias. Negative for back pain and gait problem.   Skin:  Negative for color change and pallor.   Allergic/Immunologic: Negative.    Neurological:  Negative for dizziness, facial asymmetry, weakness, light-headedness and headaches.   Hematological:  Negative for adenopathy. Does not bruise/bleed easily.   Psychiatric/Behavioral:  The patient is nervous/anxious.    All other systems reviewed and are negative.    Objective:     Vital Signs (Most Recent):  Temp: 99 °F (37.2 °C) (04/28/24 1159)  Pulse: 74 (04/28/24 1317)  Resp: 16 (04/28/24 1317)  BP: 137/81 (04/28/24 1317)  SpO2: 100 % (04/28/24 1317) Vital Signs (24h Range):  Temp:  [99 °F (37.2 °C)] 99 °F (37.2 °C)  Pulse:  [] 74  Resp:  [16-18] 16  SpO2:  [100 %] 100 %  BP: (136-137)/(74-81) 137/81     Weight: 64.9 kg (143 lb)  Body mass index is 23.8 kg/m².     Physical Exam  Vitals and nursing note reviewed.   Constitutional:       Appearance: Normal appearance. She is well-developed. She is ill-appearing.   HENT:      Head: Normocephalic and atraumatic.   Eyes:      Pupils: Pupils are equal, round, and reactive to light.   Neck:      Thyroid: No thyromegaly.      Trachea: No tracheal deviation.   Cardiovascular:      Rate and Rhythm: Normal rate and regular rhythm.       Heart sounds: Normal heart sounds.   Pulmonary:      Effort: Pulmonary effort is normal.      Breath sounds: Normal breath sounds.   Abdominal:      General: Bowel sounds are normal. There is no distension.      Palpations: Abdomen is soft.      Tenderness: There is no guarding or rebound.   Musculoskeletal:         General: Normal range of motion.      Cervical back: Normal range of motion and neck supple.   Lymphadenopathy:      Cervical: No cervical adenopathy.   Skin:     General: Skin is warm and dry.      Capillary Refill: Capillary refill takes less than 2 seconds. Patient with right 3rd digit swelling in right hand swelling both dorsal and ventral.  There is some lymphangitis of the right arm traveling through the right wrist.  Tenderness to palpation     Findings: Erythema and lesion present.   Neurological:      Mental Status: She is alert and oriented to person, place, and time.   Psychiatric:         Behavior: Behavior normal.         Thought Content: Thought content normal.         Judgment: Judgment normal.              CRANIAL NERVES     CN III, IV, VI   Pupils are equal, round, and reactive to light.       Significant Labs: All pertinent labs within the past 24 hours have been reviewed.  Recent Lab Results         04/28/24  1230        Albumin 3.4       ALP 98       ALT 34       Anion Gap 10       AST 24       Baso # 0.08       Basophil % 0.8       BILIRUBIN TOTAL 0.3  Comment: For infants and newborns, interpretation of results should be based  on gestational age, weight and in agreement with clinical  observations.    Premature Infant recommended reference ranges:  Up to 24 hours.............<8.0 mg/dL  Up to 48 hours............<12.0 mg/dL  3-5 days..................<15.0 mg/dL  6-29 days.................<15.0 mg/dL         BUN 12       Calcium 9.0       Chloride 102       CO2 25       Creatinine 0.8       CRP 46.2       Differential Method Automated       eGFR >60.0       Eos # 0.6       Eos %  6.2       Glucose 86       Gran # (ANC) 7.0       Gran % 68.1       Hematocrit 37.7       Hemoglobin 12.9       Immature Grans (Abs) 0.02  Comment: Mild elevation in immature granulocytes is non specific and   can be seen in a variety of conditions including stress response,   acute inflammation, trauma and pregnancy. Correlation with other   laboratory and clinical findings is essential.         Immature Granulocytes 0.2       Lactic Acid Level 0.6  Comment: Falsely low lactic acid results can be found in samples   containing >=13.0 mg/dL total bilirubin and/or >=3.5 mg/dL   direct bilirubin.         Lymph # 1.8       Lymph % 17.2       MCH 33.0       MCHC 34.2       MCV 96       Mono # 0.8       Mono % 7.5       MPV 9.0       nRBC 0       Platelet Count 302       Potassium 3.6       PROTEIN TOTAL 7.3       RBC 3.91       RDW 13.0       Sodium 137       WBC 10.21               Significant Imaging: I have reviewed all pertinent imaging results/findings within the past 24 hours.  Assessment/Plan:     * Cellulitis of hand, right  Begin vancomycin pharmacy to dose  Elevate extremity   Ciprofloxacin 400 mg IV twice daily   MRI of the hand tomorrow when available  Follow clinical progression or regression  Consult surgery if no resolution.\  Analgesia as needed      Tobacco abuse  Counseled regarding smoking cessation  Consult smoking cessation program on discharge   Prescribe nicotine patch on discharge and on admission except for during MRI        VTE Risk Mitigation (From admission, onward)           Ordered     enoxaparin injection 40 mg  Daily         04/28/24 1321     IP VTE HIGH RISK PATIENT  Once         04/28/24 1321     Place sequential compression device  Until discontinued         04/28/24 1321                       On 04/28/2024, patient should be placed in hospital observation services under my care.             Arnaldo Harrell MD  Department of Hospital Medicine  [place] - Emergency Dept

## 2024-04-28 NOTE — Clinical Note
Diagnosis: Cellulitis of finger of right hand [030327]   Future Attending Provider: ANGELI BELTRAN [[id number]]

## 2024-04-28 NOTE — HPI
Chief Complaint   Patient presents with    Cellulitis       Patient with significant cellulitis to the rt hand . Red streaking to rt axilla. Seen in ER placed on Abx x 4 days ago .      38-year-old female with history of depression, anxiety returns to the ED with complaints of worsening cellulitis to right 3rd finger.  She reports that she noticed increased surrounding erythema yesterday. She was seen here 3 days ago for the same, and was subsequently discharged with a prescription for Clindamycin for which she tells me she has been compliant.  Initial injury was an accident laceration with pocket knife, which got infected, and she decided to open the skin herself to facilitate drainage but apparently there was no drainage output.         Review of patient's allergies indicates:  No Known Allergies       Past Medical History:   Diagnosis Date    Anxiety      Depression     Patient denies any other past medical history.  Patient denies fever chills nausea or vomiting.  Patient denies any injury and denies any known bite sites.

## 2024-04-28 NOTE — SUBJECTIVE & OBJECTIVE
Past Medical History:   Diagnosis Date    Anxiety     Depression        Past Surgical History:   Procedure Laterality Date    APPENDECTOMY      BREAST CYST EXCISION Left     DILATION AND CURETTAGE OF UTERUS      FINGER AMPUTATION Left 3/12/2020    Procedure: AMPUTATION, FINGER;  Surgeon: Lucian Fairchild DO;  Location: [facility] OR;  Service: Orthopedics;  Laterality: Left;  Amputation of the Distal Phalanx, Left Small Finger      HYSTERECTOMY      OPEN REDUCTION AND INTERNAL FIXATION (ORIF) OF INJURY OF FINGER Left 9/26/2019    Procedure: ORIF, FINGER;  Surgeon: Lucian Fairchild DO;  Location: [facility] OR;  Service: Orthopedics;  Laterality: Left;  Equipment: [employer] Mini Frag Set/ Hand Set  Vendor/Rep: [employer]  C-Arm: [employer]  REQUIRES FIRST ASSISTANT ITZEL      TENOTOMY Left 3/12/2020    Procedure: TENOTOMY;  Surgeon: Lucian Fairchild DO;  Location: [facility] OR;  Service: Orthopedics;  Laterality: Left;       Review of patient's allergies indicates:  No Known Allergies    Current Facility-Administered Medications   Medication Dose Route Frequency Provider Last Rate Last Admin    acetaminophen tablet 650 mg  650 mg Oral Q4H PRN Arnaldo Harrell MD        ciprofloxacin (CIPRO)400mg/200ml D5W IVPB 400 mg  400 mg Intravenous Q12H Arnaldo Harrell MD        enoxaparin injection 40 mg  40 mg Subcutaneous Daily Arnaldo Harrell MD        melatonin tablet 6 mg  6 mg Oral Nightly PRN Arnaldo Harrell MD        morphine injection 2 mg  2 mg Intravenous Q4H PRN Arnaldo Harrell MD        ondansetron injection 4 mg  4 mg Intravenous Q6H PRN Arnaldo Harrell MD        ondansetron injection 4 mg  4 mg Intravenous Q8H PRN Arnaldo Harrell MD        prochlorperazine injection Soln 5 mg  5 mg Intravenous Q6H PRN Arnaldo Harrell MD        sodium chloride 0.9% bolus 1,000 mL 1,000 mL  1,000 mL Intravenous ED 1 Time Chauncey Oneill  mL/hr at 04/28/24 1241 1,000 mL at 04/28/24 1241    sodium chloride 0.9% flush 10 mL  10 mL  Intravenous Q12H PRN Arnaldo Harrell MD        vancomycin - pharmacy to dose   Intravenous pharmacy to manage frequency Arnaldo Harrell MD        vancomycin 1,250 mg in dextrose 5 % (D5W) 250 mL IVPB (Vial-Mate)  20 mg/kg Intravenous ED 1 Time Chauncey Oneill .7 mL/hr at 04/28/24 1236 1,250 mg at 04/28/24 1236     Current Outpatient Medications   Medication Sig Dispense Refill    amoxicillin (AMOXIL) 875 MG tablet Take 1 tablet (875 mg total) by mouth 2 (two) times daily. 14 tablet 0    clindamycin (CLEOCIN) 300 MG capsule Take 1 capsule (300 mg total) by mouth every 6 (six) hours. for 10 days 40 capsule 0    ondansetron (ZOFRAN) 4 MG tablet Take 1 tablet (4 mg total) by mouth every 6 (six) hours as needed for Nausea. 30 tablet 0     Facility-Administered Medications Ordered in Other Encounters   Medication Dose Route Frequency Provider Last Rate Last Admin    diphenhydrAMINE injection 12.5 mg  12.5 mg Intravenous PRN Sky Ruiz MD        lactated ringers infusion  125 mL/hr Intravenous Continuous Sky Ruiz MD   New Bag at 03/12/20 1409    lidocaine (PF) 10 mg/ml (1%) injection 10 mg  1 mL Intradermal Once Sky Ruiz MD        morphine injection 2 mg  2 mg Intravenous Q5 Min PRN Sky Ruiz MD        ondansetron injection 4 mg  4 mg Intravenous Daily PRN Sky Ruiz MD   4 mg at 03/12/20 1415    promethazine (PHENERGAN) 6.25 mg in dextrose 5 % 50 mL IVPB  6.25 mg Intravenous Q10 Min PRN Sky Ruiz MD         Family History       Problem Relation (Age of Onset)    No Known Problems Mother, Father, Sister, Brother, Son, Daughter, Daughter          Tobacco Use    Smoking status: Every Day     Current packs/day: 1.00     Average packs/day: 1 pack/day for 15.0 years (15.0 ttl pk-yrs)     Types: Cigarettes    Smokeless tobacco: Never   Substance and Sexual Activity    Alcohol use: Yes     Comment: occ    Drug use: Yes     Types: Marijuana    Sexual activity: Yes      Partners: Female     Birth control/protection: See Surgical Hx     Review of Systems   Constitutional:  Negative for activity change, appetite change, fatigue and fever.   HENT:  Negative for congestion, ear discharge, mouth sores, nosebleeds, rhinorrhea, sinus pressure, sinus pain and tinnitus.    Eyes: Negative.  Negative for pain, redness and itching.   Respiratory:  Negative for apnea, cough, choking, chest tightness, shortness of breath, wheezing and stridor.    Cardiovascular:  Negative for chest pain, palpitations and leg swelling.   Gastrointestinal:  Negative for abdominal distention, abdominal pain, anal bleeding, blood in stool, constipation and diarrhea.   Endocrine: Negative.    Genitourinary:  Negative for difficulty urinating, flank pain, frequency and urgency.   Musculoskeletal:  Positive for arthralgias, joint swelling and myalgias. Negative for back pain and gait problem.   Skin:  Negative for color change and pallor.   Allergic/Immunologic: Negative.    Neurological:  Negative for dizziness, facial asymmetry, weakness, light-headedness and headaches.   Hematological:  Negative for adenopathy. Does not bruise/bleed easily.   Psychiatric/Behavioral:  The patient is nervous/anxious.    All other systems reviewed and are negative.    Objective:     Vital Signs (Most Recent):  Temp: 99 °F (37.2 °C) (04/28/24 1159)  Pulse: 74 (04/28/24 1317)  Resp: 16 (04/28/24 1317)  BP: 137/81 (04/28/24 1317)  SpO2: 100 % (04/28/24 1317) Vital Signs (24h Range):  Temp:  [99 °F (37.2 °C)] 99 °F (37.2 °C)  Pulse:  [] 74  Resp:  [16-18] 16  SpO2:  [100 %] 100 %  BP: (136-137)/(74-81) 137/81     Weight: 64.9 kg (143 lb)  Body mass index is 23.8 kg/m².     Physical Exam  Vitals and nursing note reviewed.   Constitutional:       Appearance: Normal appearance. She is well-developed. She is ill-appearing.   HENT:      Head: Normocephalic and atraumatic.   Eyes:      Pupils: Pupils are equal, round, and reactive to  light.   Neck:      Thyroid: No thyromegaly.      Trachea: No tracheal deviation.   Cardiovascular:      Rate and Rhythm: Normal rate and regular rhythm.      Heart sounds: Normal heart sounds.   Pulmonary:      Effort: Pulmonary effort is normal.      Breath sounds: Normal breath sounds.   Abdominal:      General: Bowel sounds are normal. There is no distension.      Palpations: Abdomen is soft.      Tenderness: There is no guarding or rebound.   Musculoskeletal:         General: Normal range of motion.      Cervical back: Normal range of motion and neck supple.   Lymphadenopathy:      Cervical: No cervical adenopathy.   Skin:     General: Skin is warm and dry.      Capillary Refill: Capillary refill takes less than 2 seconds. Patient with right 3rd digit swelling in right hand swelling both dorsal and ventral.  There is some lymphangitis of the right arm traveling through the right wrist.  Tenderness to palpation     Findings: Erythema and lesion present.   Neurological:      Mental Status: She is alert and oriented to person, place, and time.   Psychiatric:         Behavior: Behavior normal.         Thought Content: Thought content normal.         Judgment: Judgment normal.              CRANIAL NERVES     CN III, IV, VI   Pupils are equal, round, and reactive to light.       Significant Labs: All pertinent labs within the past 24 hours have been reviewed.  Recent Lab Results         04/28/24  1230        Albumin 3.4       ALP 98       ALT 34       Anion Gap 10       AST 24       Baso # 0.08       Basophil % 0.8       BILIRUBIN TOTAL 0.3  Comment: For infants and newborns, interpretation of results should be based  on gestational age, weight and in agreement with clinical  observations.    Premature Infant recommended reference ranges:  Up to 24 hours.............<8.0 mg/dL  Up to 48 hours............<12.0 mg/dL  3-5 days..................<15.0 mg/dL  6-29 days.................<15.0 mg/dL         BUN 12        Calcium 9.0       Chloride 102       CO2 25       Creatinine 0.8       CRP 46.2       Differential Method Automated       eGFR >60.0       Eos # 0.6       Eos % 6.2       Glucose 86       Gran # (ANC) 7.0       Gran % 68.1       Hematocrit 37.7       Hemoglobin 12.9       Immature Grans (Abs) 0.02  Comment: Mild elevation in immature granulocytes is non specific and   can be seen in a variety of conditions including stress response,   acute inflammation, trauma and pregnancy. Correlation with other   laboratory and clinical findings is essential.         Immature Granulocytes 0.2       Lactic Acid Level 0.6  Comment: Falsely low lactic acid results can be found in samples   containing >=13.0 mg/dL total bilirubin and/or >=3.5 mg/dL   direct bilirubin.         Lymph # 1.8       Lymph % 17.2       MCH 33.0       MCHC 34.2       MCV 96       Mono # 0.8       Mono % 7.5       MPV 9.0       nRBC 0       Platelet Count 302       Potassium 3.6       PROTEIN TOTAL 7.3       RBC 3.91       RDW 13.0       Sodium 137       WBC 10.21               Significant Imaging: I have reviewed all pertinent imaging results/findings within the past 24 hours.

## 2024-04-28 NOTE — ED TRIAGE NOTES
Cellulitis of the rt hand x 1 week . Seen in ED 4 days ago. Redness ,swelling and streaking have exacerbated.

## 2024-04-28 NOTE — ASSESSMENT & PLAN NOTE
Counseled regarding smoking cessation  Consult smoking cessation program on discharge   Prescribe nicotine patch on discharge and on admission except for during MRI

## 2024-04-28 NOTE — ED PROVIDER NOTES
Encounter Date: 4/28/2024       History     Chief Complaint   Patient presents with    Cellulitis     Patient with significant cellulitis to the rt hand . Red streaking to rt axilla. Seen in ER placed on Abx x 4 days ago .     38-year-old female with history of depression, anxiety returns to the ED with complaints of worsening cellulitis to right 3rd finger.  She reports that she noticed increased surrounding erythema yesterday. She was seen here 3 days ago for the same, and was subsequently discharged with a prescription for Clindamycin for which she tells me she has been compliant.  Initial injury was an accident laceration with pocket knife, which got infected, and she decided to open the skin herself to facilitate drainage but apparently there was no drainage output.     The history is provided by the patient. No  was used.     Review of patient's allergies indicates:  No Known Allergies  Past Medical History:   Diagnosis Date    Anxiety     Depression      Past Surgical History:   Procedure Laterality Date    APPENDECTOMY      BREAST CYST EXCISION Left     DILATION AND CURETTAGE OF UTERUS      FINGER AMPUTATION Left 3/12/2020    Procedure: AMPUTATION, FINGER;  Surgeon: Lucian Fairchild DO;  Location: [facility] OR;  Service: Orthopedics;  Laterality: Left;  Amputation of the Distal Phalanx, Left Small Finger      HYSTERECTOMY      OPEN REDUCTION AND INTERNAL FIXATION (ORIF) OF INJURY OF FINGER Left 9/26/2019    Procedure: ORIF, FINGER;  Surgeon: Lucian Fairchild DO;  Location: [facility] OR;  Service: Orthopedics;  Laterality: Left;  Equipment: [employer] Mini Frag Set/ Hand Set  Vendor/Rep: [employer]  C-Arm: Entire  REQUIRES FIRST ASSISTANT ITZEL      TENOTOMY Left 3/12/2020    Procedure: TENOTOMY;  Surgeon: Lucian Fairchild DO;  Location: [facility] OR;  Service: Orthopedics;  Laterality: Left;     Family History   Problem Relation Name Age of Onset    No Known Problems Mother      No Known Problems Father       No Known Problems Sister      No Known Problems Brother      No Known Problems Son      No Known Problems Daughter      No Known Problems Daughter       Social History     Tobacco Use    Smoking status: Every Day     Current packs/day: 1.00     Average packs/day: 1 pack/day for 15.0 years (15.0 ttl pk-yrs)     Types: Cigarettes    Smokeless tobacco: Never   Substance Use Topics    Alcohol use: Yes     Comment: occ    Drug use: Yes     Types: Marijuana     Review of Systems   Constitutional: Negative.    HENT: Negative.     Eyes: Negative.    Respiratory: Negative.     Cardiovascular: Negative.    Gastrointestinal: Negative.    Endocrine: Negative.    Genitourinary: Negative.    Musculoskeletal: Negative.    Skin:  Positive for wound.   Neurological: Negative.    Hematological: Negative.    Psychiatric/Behavioral: Negative.     All other systems reviewed and are negative.      Physical Exam     Initial Vitals [04/28/24 1159]   BP Pulse Resp Temp SpO2   136/74 104 18 99 °F (37.2 °C) 100 %      MAP       --         Physical Exam    Nursing note and vitals reviewed.  Constitutional: She appears well-developed.   HENT:   Head: Normocephalic.   Eyes: Pupils are equal, round, and reactive to light.   Neck:   Normal range of motion.  Cardiovascular:  Normal rate.           Pulmonary/Chest: Breath sounds normal.   Abdominal: Abdomen is soft. Bowel sounds are normal.   Musculoskeletal:         General: Normal range of motion.      Cervical back: Normal range of motion.     Neurological: She is alert and oriented to person, place, and time. She has normal strength. GCS score is 15. GCS eye subscore is 4. GCS verbal subscore is 5. GCS motor subscore is 6.   Skin: Skin is warm. Capillary refill takes less than 2 seconds.   Diffusely tender right 3rd finger with circumferential soft tissue swelling, erythema greatest at the PIP joint.  There is and extension of erythematous streaking of the medial forearm close to the axilla  without lymph node swelling.  No associated bullae         ED Course   Procedures  Labs Reviewed   CBC W/ AUTO DIFFERENTIAL - Abnormal; Notable for the following components:       Result Value    RBC 3.91 (*)     MCH 33.0 (*)     MPV 9.0 (*)     Eos # 0.6 (*)     Lymph % 17.2 (*)     All other components within normal limits   COMPREHENSIVE METABOLIC PANEL   LACTIC ACID, PLASMA   PROCALCITONIN   SEDIMENTATION RATE   C-REACTIVE PROTEIN          Imaging Results              X-Ray Hand 3 view Right (In process)                      Medications   sodium chloride 0.9% bolus 1,000 mL 1,000 mL (1,000 mLs Intravenous Bolus from Bag 4/28/24 1241)   vancomycin 1,250 mg in dextrose 5 % (D5W) 250 mL IVPB (Vial-Mate) (1,250 mg Intravenous New Bag 4/28/24 1236)     Medical Decision Making  38-year-old returns with worsening cellulitis right 3rd finger.  See H&P above for details  She has failed outpatient oral clindamycin  Basic labs, inflammatory markers ordered. Vancomycin ordered to be given  Will admit to hospitalist service for further IV antibiotics.  Patient is agreeable    Amount and/or Complexity of Data Reviewed  Labs: ordered.                                      Clinical Impression:  Final diagnoses:  [L03.011] Cellulitis of finger of right hand (Primary)                 Chauncey Oneill MD  04/28/24 1953

## 2024-04-28 NOTE — ASSESSMENT & PLAN NOTE
Begin vancomycin pharmacy to dose  Elevate extremity   Ciprofloxacin 400 mg IV twice daily   MRI of the hand tomorrow when available  Follow clinical progression or regression  Consult surgery if no resolution.\  Analgesia as needed

## 2024-04-28 NOTE — PROGRESS NOTES
"Pharmacokinetic Initial Assessment: IV Vancomycin    Assessment/Plan:    Initiate intravenous vancomycin with loading dose of 1250 mg once followed by a maintenance dose of vancomycin 1000mg IV every 12 hours  Desired empiric serum trough concentration is 10 to 15 mcg/mL  Draw vancomycin trough level 60 min prior to fourth dose on 04/29/24 at approximately 2330  Pharmacy will continue to follow and monitor vancomycin.      Please contact pharmacy at extension [phone] with any questions regarding this assessment.     Thank you for the consult,   Aurelia Weissrary       Patient brief summary:  Anna Brock is a 38 y.o. female initiated on antimicrobial therapy with IV Vancomycin for treatment of suspected skin & soft tissue infection    Drug Allergies:   Review of patient's allergies indicates:  No Known Allergies    Actual Body Weight:   64.9kg    Renal Function:   Estimated Creatinine Clearance: 86.6 mL/min (based on SCr of 0.8 mg/dL).,     Dialysis Method (if applicable):  N/A    CBC (last 72 hours):  Recent Labs   Lab Result Units 04/28/24  1230   WBC K/uL 10.21   Hemoglobin g/dL 12.9   Hematocrit % 37.7   Platelets K/uL 302   Gran % % 68.1   Lymph % % 17.2*   Mono % % 7.5   Eosinophil % % 6.2   Basophil % % 0.8   Differential Method  Automated       Metabolic Panel (last 72 hours):  Recent Labs   Lab Result Units 04/28/24  1230   Sodium mmol/L 137   Potassium mmol/L 3.6   Chloride mmol/L 102   CO2 mmol/L 25   Glucose mg/dL 86   BUN mg/dL 12   Creatinine mg/dL 0.8   Albumin g/dL 3.4*   Total Bilirubin mg/dL 0.3   Alkaline Phosphatase U/L 98   AST U/L 24   ALT U/L 34       Drug levels (last 3 results):  No results for input(s): "VANCOMYCINRA", "VANCORANDOM", "VANCOMYCINPE", "VANCOPEAK", "VANCOMYCINTR", "VANCOTROUGH" in the last 72 hours.    Microbiologic Results:  Microbiology Results (last 7 days)       Procedure Component Value Units Date/Time    Blood culture (site 1) [[id number]]     Order Status: Sent " Specimen: Blood

## 2024-04-28 NOTE — PLAN OF CARE
Free from falls, skin breakdown, or injury. NADN. Respirations even and unlabored on room air. A&O x 4. Tolerating diet well as ordered. Denies pain. Bed in lowest, locked position, side rails elevated x2.  Call bell in reach. Purposeful rounding done every hour.

## 2024-04-29 LAB
ALBUMIN SERPL BCP-MCNC: 2.8 G/DL (ref 3.5–5.2)
ALP SERPL-CCNC: 85 U/L (ref 55–135)
ALT SERPL W/O P-5'-P-CCNC: 27 U/L (ref 10–44)
ANION GAP SERPL CALC-SCNC: 9 MMOL/L (ref 8–16)
AST SERPL-CCNC: 18 U/L (ref 10–40)
BASOPHILS # BLD AUTO: 0.09 K/UL (ref 0–0.2)
BASOPHILS NFR BLD: 1.2 % (ref 0–1.9)
BILIRUB SERPL-MCNC: 0.1 MG/DL (ref 0.1–1)
BUN SERPL-MCNC: 8 MG/DL (ref 6–20)
CALCIUM SERPL-MCNC: 8.4 MG/DL (ref 8.7–10.5)
CHLORIDE SERPL-SCNC: 107 MMOL/L (ref 95–110)
CO2 SERPL-SCNC: 24 MMOL/L (ref 23–29)
CREAT SERPL-MCNC: 0.7 MG/DL (ref 0.5–1.4)
DIFFERENTIAL METHOD BLD: ABNORMAL
EOSINOPHIL # BLD AUTO: 0.9 K/UL (ref 0–0.5)
EOSINOPHIL NFR BLD: 11.5 % (ref 0–8)
ERYTHROCYTE [DISTWIDTH] IN BLOOD BY AUTOMATED COUNT: 13.1 % (ref 11.5–14.5)
EST. GFR  (NO RACE VARIABLE): >60 ML/MIN/1.73 M^2
GLUCOSE SERPL-MCNC: 104 MG/DL (ref 70–110)
HCT VFR BLD AUTO: 39 % (ref 37–48.5)
HGB BLD-MCNC: 12.9 G/DL (ref 12–16)
IMM GRANULOCYTES # BLD AUTO: 0.03 K/UL (ref 0–0.04)
IMM GRANULOCYTES NFR BLD AUTO: 0.4 % (ref 0–0.5)
LYMPHOCYTES # BLD AUTO: 2.2 K/UL (ref 1–4.8)
LYMPHOCYTES NFR BLD: 29.1 % (ref 18–48)
MCH RBC QN AUTO: 32.7 PG (ref 27–31)
MCHC RBC AUTO-ENTMCNC: 33.1 G/DL (ref 32–36)
MCV RBC AUTO: 99 FL (ref 82–98)
MONOCYTES # BLD AUTO: 0.8 K/UL (ref 0.3–1)
MONOCYTES NFR BLD: 10.6 % (ref 4–15)
NEUTROPHILS # BLD AUTO: 3.5 K/UL (ref 1.8–7.7)
NEUTROPHILS NFR BLD: 47.2 % (ref 38–73)
NRBC BLD-RTO: 0 /100 WBC
PLATELET # BLD AUTO: 296 K/UL (ref 150–450)
PMV BLD AUTO: 9.2 FL (ref 9.2–12.9)
POTASSIUM SERPL-SCNC: 3.8 MMOL/L (ref 3.5–5.1)
PROT SERPL-MCNC: 6.2 G/DL (ref 6–8.4)
RBC # BLD AUTO: 3.94 M/UL (ref 4–5.4)
SODIUM SERPL-SCNC: 140 MMOL/L (ref 136–145)
VANCOMYCIN TROUGH SERPL-MCNC: 6.6 UG/ML (ref 10–22)
WBC # BLD AUTO: 7.48 K/UL (ref 3.9–12.7)

## 2024-04-29 PROCEDURE — 80202 ASSAY OF VANCOMYCIN: CPT | Performed by: INTERNAL MEDICINE

## 2024-04-29 PROCEDURE — 97116 GAIT TRAINING THERAPY: CPT

## 2024-04-29 PROCEDURE — 99233 SBSQ HOSP IP/OBS HIGH 50: CPT | Mod: ,,, | Performed by: INTERNAL MEDICINE

## 2024-04-29 PROCEDURE — 97161 PT EVAL LOW COMPLEX 20 MIN: CPT

## 2024-04-29 PROCEDURE — 80053 COMPREHEN METABOLIC PANEL: CPT | Performed by: INTERNAL MEDICINE

## 2024-04-29 PROCEDURE — 96366 THER/PROPH/DIAG IV INF ADDON: CPT

## 2024-04-29 PROCEDURE — G0378 HOSPITAL OBSERVATION PER HR: HCPCS

## 2024-04-29 PROCEDURE — 97166 OT EVAL MOD COMPLEX 45 MIN: CPT

## 2024-04-29 PROCEDURE — 85025 COMPLETE CBC W/AUTO DIFF WBC: CPT | Performed by: INTERNAL MEDICINE

## 2024-04-29 PROCEDURE — S4991 NICOTINE PATCH NONLEGEND: HCPCS | Performed by: INTERNAL MEDICINE

## 2024-04-29 PROCEDURE — 63600175 PHARM REV CODE 636 W HCPCS: Performed by: INTERNAL MEDICINE

## 2024-04-29 PROCEDURE — 96365 THER/PROPH/DIAG IV INF INIT: CPT | Mod: 59

## 2024-04-29 PROCEDURE — 36415 COLL VENOUS BLD VENIPUNCTURE: CPT | Performed by: INTERNAL MEDICINE

## 2024-04-29 PROCEDURE — 96372 THER/PROPH/DIAG INJ SC/IM: CPT | Performed by: INTERNAL MEDICINE

## 2024-04-29 PROCEDURE — 25000003 PHARM REV CODE 250: Performed by: INTERNAL MEDICINE

## 2024-04-29 PROCEDURE — 25500020 PHARM REV CODE 255: Performed by: INTERNAL MEDICINE

## 2024-04-29 RX ORDER — IBUPROFEN 200 MG
1 TABLET ORAL DAILY
Status: DISCONTINUED | OUTPATIENT
Start: 2024-04-29 | End: 2024-04-30 | Stop reason: HOSPADM

## 2024-04-29 RX ADMIN — IOHEXOL 75 ML: 350 INJECTION, SOLUTION INTRAVENOUS at 03:04

## 2024-04-29 RX ADMIN — CIPROFLOXACIN 400 MG: 2 INJECTION, SOLUTION INTRAVENOUS at 04:04

## 2024-04-29 RX ADMIN — ENOXAPARIN SODIUM 40 MG: 40 INJECTION SUBCUTANEOUS at 04:04

## 2024-04-29 RX ADMIN — CIPROFLOXACIN 400 MG: 2 INJECTION, SOLUTION INTRAVENOUS at 02:04

## 2024-04-29 RX ADMIN — NICOTINE 1 PATCH: 21 PATCH, EXTENDED RELEASE TRANSDERMAL at 03:04

## 2024-04-29 RX ADMIN — VANCOMYCIN HYDROCHLORIDE 1000 MG: 1 INJECTION, POWDER, LYOPHILIZED, FOR SOLUTION INTRAVENOUS at 11:04

## 2024-04-29 NOTE — PLAN OF CARE
[facility] - Comprehensive Care Unit  Initial Discharge Assessment       Primary Care Provider: Amelia, Primary Doctor    Admission Diagnosis: Cellulitis of finger of right hand [L03.011]    Admission Date: 4/28/2024  Expected Discharge Date:     Transition of Care Barriers: Unisured, Transportation      Patient alert & oriented lives at home with her 19yr old daughter & 2 friends. She is independent at home. She does not have a car so walks or rides a bike wherever she needs to go if she can't get a ride. She has not seen a doctor in a long time. Will get her established with [facility] since uninsured at this time. Will give her a voucher to use for medications at [facility]. Will provide her with phone number to [facility] financial assistance. Denies any other needs at this time. Will continue to follow.    Payor: /     Extended Emergency Contact Information  Primary Emergency Contact: CHRISTIAN EPSTEIN  Mobile Phone: [phone]  Relation: Daughter  Preferred language: English   needed? No    Discharge Plan A: Home with family  Discharge Plan B: Home      [facility] [address] [facility], [place] [address] [facility] [address]  [address] [place] [id number]  Phone: [phone] Fax: [phone]    [facility] [address] [facility]  [address] [facility] [address]  Phone: [phone] Fax: [phone]      Initial Assessment (most recent)       Adult Discharge Assessment - 04/29/24 1421          Discharge Assessment    Assessment Type Discharge Planning Assessment     Confirmed/corrected address, phone number and insurance Yes     Confirmed Demographics Correct on Facesheet     Source of Information patient     When was your last doctors appointment? --   years ago    Does patient/caregiver understand observation status Yes     Communicated JASON with patient/caregiver Yes     Reason For Admission finger cellulitis     People in Home  friend(s);child(juanito), adult     Do you expect to return to your current living situation? Yes     Do you have help at home or someone to help you manage your care at home? Yes     Who are your caregiver(s) and their phone number(s)? Faith Saenz daughter [phone]     Prior to hospitilization cognitive status: Alert/Oriented     Current cognitive status: Alert/Oriented     Walking or Climbing Stairs Difficulty no     Dressing/Bathing Difficulty no     Home Accessibility stairs to enter home     Number of Stairs, Main Entrance five     Home Layout Able to live on 1st floor     Equipment Currently Used at Home none     Readmission within 30 days? No     Patient currently being followed by outpatient case management? No     Do you currently have service(s) that help you manage your care at home? No     Do you take prescription medications? No     Do you have prescription coverage? No     Do you have any problems affording any of your prescribed medications? TBD     Is the patient taking medications as prescribed? --   NA    Who is going to help you get home at discharge? probably her friend Hakeem     How do you get to doctors appointments? family or friend will provide;other (see comments)   walks or rides a bike    Are you on dialysis? No     Do you take coumadin? No     Discharge Plan A Home with family     Discharge Plan B Home     DME Needed Upon Discharge  none     Discharge Plan discussed with: Patient     Transition of Care Barriers Unisured;Transportation        Physical Activity    On average, how many days per week do you engage in moderate to strenuous exercise (like a brisk walk)? 7 days     On average, how many minutes do you engage in exercise at this level? 150+ min        Financial Resource Strain    How hard is it for you to pay for the very basics like food, housing, medical care, and heating? Very hard        Housing Stability    In the last 12 months, was there a time when you were not able  to pay the mortgage or rent on time? No     In the past 12 months, how many times have you moved where you were living? 1     At any time in the past 12 months, were you homeless or living in a shelter (including now)? No        Transportation Needs    In the past 12 months, has lack of transportation kept you from medical appointments or from getting medications? No     In the past 12 months, has lack of transportation kept you from meetings, work, or from getting things needed for daily living? No        Food Insecurity    Within the past 12 months, you worried that your food would run out before you got the money to buy more. Never true     Within the past 12 months, the food you bought just didn't last and you didn't have money to get more. Never true        Stress    Do you feel stress - tense, restless, nervous, or anxious, or unable to sleep at night because your mind is troubled all the time - these days? Very much        Social Connections    In a typical week, how many times do you talk on the phone with family, friends, or neighbors? Twice a week     How often do you get together with friends or relatives? More than three times a week     Do you belong to any clubs or organizations such as Temple groups, unions, fraternal or athletic groups, or school groups? No     Are you , , , , never , or living with a partner? Never         Alcohol Use    Q1: How often do you have a drink containing alcohol? 2-3 times a week     Q2: How many drinks containing alcohol do you have on a typical day when you are drinking? 5 or 6     Q3: How often do you have six or more drinks on one occasion? Monthly        OTHER    Name(s) of People in Home Faith Saenz daughter [phone]

## 2024-04-29 NOTE — SUBJECTIVE & OBJECTIVE
Interval History:  Patient's hand is much improved this morning with less erythema and less swelling of the 3rd digit.  There is less tenderness in the right palm and forearm.  Labs are normal with no significant changes.  Patient has been afebrile.  CT scan of the arm and hand are pending.  Patient should be ready for discharge tomorrow if CT scan negative    Review of Systems   Constitutional:  Negative for activity change, appetite change, fatigue and fever.   HENT:  Negative for congestion, ear discharge, mouth sores, nosebleeds, rhinorrhea, sinus pressure, sinus pain and tinnitus.    Eyes: Negative.  Negative for pain, redness and itching.   Respiratory:  Negative for apnea, cough, choking, chest tightness, shortness of breath, wheezing and stridor.    Cardiovascular:  Negative for chest pain, palpitations and leg swelling.   Gastrointestinal:  Negative for abdominal distention, abdominal pain, anal bleeding, blood in stool, constipation and diarrhea.   Endocrine: Negative.    Genitourinary:  Negative for difficulty urinating, flank pain, frequency and urgency.   Musculoskeletal:  Positive for arthralgias, joint swelling and myalgias. Negative for back pain and gait problem.   Skin:  Negative for color change and pallor.   Allergic/Immunologic: Negative.    Neurological:  Negative for dizziness, facial asymmetry, weakness, light-headedness and headaches.   Hematological:  Negative for adenopathy. Does not bruise/bleed easily.   Psychiatric/Behavioral:  The patient is nervous/anxious.    All other systems reviewed and are negative.    Objective:     Vital Signs (Most Recent):  Temp: 98.1 °F (36.7 °C) (04/29/24 1113)  Pulse: 79 (04/29/24 1113)  Resp: 14 (04/29/24 1113)  BP: 113/69 (04/29/24 1113)  SpO2: 100 % (04/29/24 1113) Vital Signs (24h Range):  Temp:  [97.4 °F (36.3 °C)-98.4 °F (36.9 °C)] 98.1 °F (36.7 °C)  Pulse:  [65-79] 79  Resp:  [14-18] 14  SpO2:  [100 %] 100 %  BP: (110-125)/(62-80) 113/69     Weight:  67.5 kg (148 lb 13 oz)  Body mass index is 24.76 kg/m².  No intake or output data in the 24 hours ending 04/29/24 1356      Physical Exam  Vitals and nursing note reviewed.   Constitutional:       Appearance: Normal appearance. She is well-developed. She is ill-appearing.   HENT:      Head: Normocephalic and atraumatic.   Eyes:      Pupils: Pupils are equal, round, and reactive to light.   Neck:      Thyroid: No thyromegaly.      Trachea: No tracheal deviation.   Cardiovascular:      Rate and Rhythm: Normal rate and regular rhythm.      Heart sounds: Normal heart sounds.   Pulmonary:      Effort: Pulmonary effort is normal.      Breath sounds: Normal breath sounds.   Abdominal:      General: Bowel sounds are normal. There is no distension.      Palpations: Abdomen is soft.      Tenderness: There is no guarding or rebound.   Musculoskeletal:         General: Normal range of motion.      Cervical back: Normal range of motion and neck supple.   Lymphadenopathy:      Cervical: No cervical adenopathy.   Skin:     General: Skin is warm and dry.      Capillary Refill: Capillary refill takes less than 2 seconds. Patient with right 3rd digit swelling in right hand swelling both dorsal and ventral.  There is some lymphangitis of the right arm traveling through the right wrist.  Tenderness to palpation     Findings: Erythema and lesion present.   Neurological:      Mental Status: She is alert and oriented to person, place, and time.   Psychiatric:         Behavior: Behavior normal.         Thought Content: Thought content normal.         Judgment: Judgment normal.             Significant Labs: All pertinent labs within the past 24 hours have been reviewed.  Recent Lab Results         04/29/24  0610        Albumin 2.8       ALP 85       ALT 27       Anion Gap 9       AST 18       Baso # 0.09       Basophil % 1.2       BILIRUBIN TOTAL 0.1  Comment: For infants and newborns, interpretation of results should be based  on  gestational age, weight and in agreement with clinical  observations.    Premature Infant recommended reference ranges:  Up to 24 hours.............<8.0 mg/dL  Up to 48 hours............<12.0 mg/dL  3-5 days..................<15.0 mg/dL  6-29 days.................<15.0 mg/dL         BUN 8       Calcium 8.4       Chloride 107       CO2 24       Creatinine 0.7       Differential Method Automated       eGFR >60.0       Eos # 0.9       Eos % 11.5       Glucose 104       Gran # (ANC) 3.5       Gran % 47.2       Hematocrit 39.0       Hemoglobin 12.9       Immature Grans (Abs) 0.03  Comment: Mild elevation in immature granulocytes is non specific and   can be seen in a variety of conditions including stress response,   acute inflammation, trauma and pregnancy. Correlation with other   laboratory and clinical findings is essential.         Immature Granulocytes 0.4       Lymph # 2.2       Lymph % 29.1       MCH 32.7       MCHC 33.1       MCV 99       Mono # 0.8       Mono % 10.6       MPV 9.2       nRBC 0       Platelet Count 296       Potassium 3.8       PROTEIN TOTAL 6.2       RBC 3.94       RDW 13.1       Sodium 140       WBC 7.48               Significant Imaging: I have reviewed all pertinent imaging results/findings within the past 24 hours.

## 2024-04-29 NOTE — PLAN OF CARE
Problem: Adult Inpatient Plan of Care  Goal: Plan of Care Review  Outcome: Progressing     Problem: Adult Inpatient Plan of Care  Goal: Patient-Specific Goal (Individualized)  Outcome: Progressing     Problem: Adult Inpatient Plan of Care  Goal: Absence of Hospital-Acquired Illness or Injury  Outcome: Progressing     Problem: Adult Inpatient Plan of Care  Goal: Optimal Comfort and Wellbeing  Outcome: Progressing     Problem: Adult Inpatient Plan of Care  Goal: Readiness for Transition of Care  Outcome: Progressing     Problem: Infection  Goal: Absence of Infection Signs and Symptoms  Outcome: Progressing     Problem: Pain Acute  Goal: Optimal Pain Control and Function  Outcome: Progressing

## 2024-04-29 NOTE — PT/OT/SLP EVAL
Occupational Therapy Evaluation  ONLY    Name: Anna Brock  MRN: [id number]  Admitting Diagnosis: Cellulitis of hand, right  Recent Surgery: * No surgery found *      Recommendations:     Discharge Recommendations:  DC TO HOME  Level of Assistance Recommended:  IND W/ ALL ADLS  Discharge Equipment Recommendations:  NONE  Barriers to discharge:  NONE    Assessment:     Anna Brock is a 38 y.o. female with a medical diagnosis of Cellulitis of hand, right. She presents with performance deficits affecting function including  NO FUNCTIONAL DEFICITS .     Rehab Prognosis: Good; patient would benefit from acute OT services FOR EVALUATION ONLY AS PATIENT IS IND W/ ALL ADLS.     Plan:     Patient to be seen  FOR EVALUATION ONLY    Subjective     Chief Complaint: CELLULITIS HAND RIGHT  Patient Comments/Goals: NON  Pain/Comfort:   NONE AT THIS TIME    Patients cultural, spiritual, Nondenominational conflicts given the current situation:  NONE    Social History:  Living Environment: Patient lives with their child(juanito) in a single story home with number of outside stair(s): NO  Prior Level of Function: Prior to admission, patient was independent  Roles and Routines: Patient was driving prior to admission.  Equipment Used at Home:  NONE  DME owned (not currently used): none  Assistance Upon Discharge: none (not needed)    Objective:     Communicated with NURSING prior to session. Patient found supine with   upon OT entry to room.    General Precautions: Standard,     Orthopedic Precautions:   NONE  Braces:    NONE  Respiratory Status: Room air    Occupational Performance    Gait belt applied - No    Bed Mobility:   Rolling/Turning to Left with independence  Rolling/Turning to Right with independence  Scooting to HOB in supine: independence  Scooting anteriorly to EOB to have both feet planted on floor: independence  Supine to sit from right side of bed with independence  Supine to sit from left side of bed with independence  Sit to  Supine with independence on RIGHT side of bed    Functional Mobility/Transfers:  Sit <> Stand Transfer with independence with no AD      Activities of Daily Living:  Feeding: independence  Grooming: independence  Bathing: independence  Upper Body Dressing: independence  Lower Body Dressing: independence  Toileting: independence    Cognitive/Visual Perceptual:  Cognitive/Psychosocial Skills:    -     Oriented to: Person, Place, Time, Situation  -     Follows Commands/attention: Follows multistep  commands  -     Communication: clear/fluent  -     Memory: No Deficits noted  -     Safety awareness/insight to disability: intact  -     Mood/Affect/Coping skills/emotional control: Cooperative    Physical Exam:  Balance:    -     Sitting: independence  -     Standing: independence  Postural examination/scapula alignment:    -       No postural abnormalities identified  Skin integrity: ERYTHEMA RIGHT MIDDLE FINGER  Edema:  Mild RIGHT MIDDLE FINGER  Sensation:    -       Intact  Motor Planning: Intact  Dominant hand: Right  Upper Extremity Range of Motion:     -       Right Upper Extremity: WFL  -       Left Upper Extremity: WFL  Upper Extremity Strength:    -       Right Upper Extremity: WFL  -       Left Upper Extremity: WFL   Strength:    -       Right Upper Extremity: WFL  Fine Motor Coordination:    -       Intact  Gross motor coordination:   WFL    AMPAC 6 Click ADL:  AMPAC Total Score:      Treatment & Education:  Therapist provided facilitation and instruction of proper body mechanics, energy conservation, and fall prevention strategies during tasks listed above  Patient educated on role of OT, POC, and goals for therapy  Patient educated on importance of OOB activities with staff member assistance and sitting OOB majority of the day  PATIENT FOR EVAL ONLY      Patient clear to ambulate to/from bathroom with RN/PCT, assist xSBA TO IND .    Patient left supine with all lines intact, call button in reach, and RN  notified.    GOALS:   NO GOALS AS PATIENT EVALUATION ONLY       History:     Past Medical History:   Diagnosis Date    Anxiety     Depression          Past Surgical History:   Procedure Laterality Date    APPENDECTOMY      BREAST CYST EXCISION Left     DILATION AND CURETTAGE OF UTERUS      FINGER AMPUTATION Left 3/12/2020    Procedure: AMPUTATION, FINGER;  Surgeon: Lucian Fairchild DO;  Location: [facility] OR;  Service: Orthopedics;  Laterality: Left;  Amputation of the Distal Phalanx, Left Small Finger      HYSTERECTOMY      OPEN REDUCTION AND INTERNAL FIXATION (ORIF) OF INJURY OF FINGER Left 9/26/2019    Procedure: ORIF, FINGER;  Surgeon: Lucian Fairchild DO;  Location: [facility] OR;  Service: Orthopedics;  Laterality: Left;  Equipment: [employer] Mini Frag Set/ Hand Set  Vendor/Rep: [employer]  C-Arm: Entire  REQUIRES FIRST ASSISTANT ITZEL      TENOTOMY Left 3/12/2020    Procedure: TENOTOMY;  Surgeon: Lucian Fairchild DO;  Location: [facility] OR;  Service: Orthopedics;  Laterality: Left;       Time Tracking:     OT Date of Treatment:  04/29/2024  OT Start Time:  10:05  OT Stop Time:  10:20  OT Total Time (min):  15    Billable Minutes: Evaluation 15    4/29/2024

## 2024-04-29 NOTE — PROGRESS NOTES
[facility] Medicine  Progress Note    Patient Name: Anna Brock  MRN: [id number]  Patient Class: OP- Observation   Admission Date: 4/28/2024  Length of Stay: 0 days  Attending Physician: Arnaldo Harrell MD  Primary Care Provider: Amelia, Primary Doctor        Subjective:     Principal Problem:Cellulitis of hand, right        HPI:       Chief Complaint   Patient presents with    Cellulitis       Patient with significant cellulitis to the rt hand . Red streaking to rt axilla. Seen in ER placed on Abx x 4 days ago .      38-year-old female with history of depression, anxiety returns to the ED with complaints of worsening cellulitis to right 3rd finger.  She reports that she noticed increased surrounding erythema yesterday. She was seen here 3 days ago for the same, and was subsequently discharged with a prescription for Clindamycin for which she tells me she has been compliant.  Initial injury was an accident laceration with pocket knife, which got infected, and she decided to open the skin herself to facilitate drainage but apparently there was no drainage output.         Review of patient's allergies indicates:  No Known Allergies       Past Medical History:   Diagnosis Date    Anxiety      Depression     Patient denies any other past medical history.  Patient denies fever chills nausea or vomiting.  Patient denies any injury and denies any known bite sites.    Overview/Hospital Course:  No notes on file    Interval History:  Patient's hand is much improved this morning with less erythema and less swelling of the 3rd digit.  There is less tenderness in the right palm and forearm.  Labs are normal with no significant changes.  Patient has been afebrile.  CT scan of the arm and hand are pending.  Patient should be ready for discharge tomorrow if CT scan negative    Review of Systems   Constitutional:  Negative for activity change, appetite change, fatigue and fever.   HENT:  Negative for  congestion, ear discharge, mouth sores, nosebleeds, rhinorrhea, sinus pressure, sinus pain and tinnitus.    Eyes: Negative.  Negative for pain, redness and itching.   Respiratory:  Negative for apnea, cough, choking, chest tightness, shortness of breath, wheezing and stridor.    Cardiovascular:  Negative for chest pain, palpitations and leg swelling.   Gastrointestinal:  Negative for abdominal distention, abdominal pain, anal bleeding, blood in stool, constipation and diarrhea.   Endocrine: Negative.    Genitourinary:  Negative for difficulty urinating, flank pain, frequency and urgency.   Musculoskeletal:  Positive for arthralgias, joint swelling and myalgias. Negative for back pain and gait problem.   Skin:  Negative for color change and pallor.   Allergic/Immunologic: Negative.    Neurological:  Negative for dizziness, facial asymmetry, weakness, light-headedness and headaches.   Hematological:  Negative for adenopathy. Does not bruise/bleed easily.   Psychiatric/Behavioral:  The patient is nervous/anxious.    All other systems reviewed and are negative.    Objective:     Vital Signs (Most Recent):  Temp: 98.1 °F (36.7 °C) (04/29/24 1113)  Pulse: 79 (04/29/24 1113)  Resp: 14 (04/29/24 1113)  BP: 113/69 (04/29/24 1113)  SpO2: 100 % (04/29/24 1113) Vital Signs (24h Range):  Temp:  [97.4 °F (36.3 °C)-98.4 °F (36.9 °C)] 98.1 °F (36.7 °C)  Pulse:  [65-79] 79  Resp:  [14-18] 14  SpO2:  [100 %] 100 %  BP: (110-125)/(62-80) 113/69     Weight: 67.5 kg (148 lb 13 oz)  Body mass index is 24.76 kg/m².  No intake or output data in the 24 hours ending 04/29/24 1356      Physical Exam  Vitals and nursing note reviewed.   Constitutional:       Appearance: Normal appearance. She is well-developed. She is ill-appearing.   HENT:      Head: Normocephalic and atraumatic.   Eyes:      Pupils: Pupils are equal, round, and reactive to light.   Neck:      Thyroid: No thyromegaly.      Trachea: No tracheal deviation.   Cardiovascular:       Rate and Rhythm: Normal rate and regular rhythm.      Heart sounds: Normal heart sounds.   Pulmonary:      Effort: Pulmonary effort is normal.      Breath sounds: Normal breath sounds.   Abdominal:      General: Bowel sounds are normal. There is no distension.      Palpations: Abdomen is soft.      Tenderness: There is no guarding or rebound.   Musculoskeletal:         General: Normal range of motion.      Cervical back: Normal range of motion and neck supple.   Lymphadenopathy:      Cervical: No cervical adenopathy.   Skin:     General: Skin is warm and dry.      Capillary Refill: Capillary refill takes less than 2 seconds. Patient with right 3rd digit swelling in right hand swelling both dorsal and ventral.  There is some lymphangitis of the right arm traveling through the right wrist.  Tenderness to palpation     Findings: Erythema and lesion present.   Neurological:      Mental Status: She is alert and oriented to person, place, and time.   Psychiatric:         Behavior: Behavior normal.         Thought Content: Thought content normal.         Judgment: Judgment normal.             Significant Labs: All pertinent labs within the past 24 hours have been reviewed.  Recent Lab Results         04/29/24  0610        Albumin 2.8       ALP 85       ALT 27       Anion Gap 9       AST 18       Baso # 0.09       Basophil % 1.2       BILIRUBIN TOTAL 0.1  Comment: For infants and newborns, interpretation of results should be based  on gestational age, weight and in agreement with clinical  observations.    Premature Infant recommended reference ranges:  Up to 24 hours.............<8.0 mg/dL  Up to 48 hours............<12.0 mg/dL  3-5 days..................<15.0 mg/dL  6-29 days.................<15.0 mg/dL         BUN 8       Calcium 8.4       Chloride 107       CO2 24       Creatinine 0.7       Differential Method Automated       eGFR >60.0       Eos # 0.9       Eos % 11.5       Glucose 104       Gran # (ANC) 3.5        Gran % 47.2       Hematocrit 39.0       Hemoglobin 12.9       Immature Grans (Abs) 0.03  Comment: Mild elevation in immature granulocytes is non specific and   can be seen in a variety of conditions including stress response,   acute inflammation, trauma and pregnancy. Correlation with other   laboratory and clinical findings is essential.         Immature Granulocytes 0.4       Lymph # 2.2       Lymph % 29.1       MCH 32.7       MCHC 33.1       MCV 99       Mono # 0.8       Mono % 10.6       MPV 9.2       nRBC 0       Platelet Count 296       Potassium 3.8       PROTEIN TOTAL 6.2       RBC 3.94       RDW 13.1       Sodium 140       WBC 7.48               Significant Imaging: I have reviewed all pertinent imaging results/findings within the past 24 hours.    Assessment/Plan:      * Cellulitis of hand, right  Begin vancomycin pharmacy to dose  Elevate extremity   Ciprofloxacin 400 mg IV twice daily   MRI of the hand tomorrow when available  Follow clinical progression or regression  Consult surgery if no resolution.\  Analgesia as needed      Tobacco abuse  Counseled regarding smoking cessation  Consult smoking cessation program on discharge   Prescribe nicotine patch on discharge and on admission except for during MRI        VTE Risk Mitigation (From admission, onward)           Ordered     enoxaparin injection 40 mg  Daily         04/28/24 1321     IP VTE HIGH RISK PATIENT  Once         04/28/24 1321     Place sequential compression device  Until discontinued         04/28/24 1321                    Discharge Planning   JASON:      Code Status: Full Code   Is the patient medically ready for discharge?:     Reason for patient still in hospital (select all that apply): Treatment                     Arnaldo Harrell MD  Department of Hospital Medicine   [facility]

## 2024-04-29 NOTE — PT/OT/SLP EVAL
Physical Therapy Evaluation    Patient Name:  Anna Brock   MRN:  [id number]    Recommendations:     Discharge Recommendations: No Therapy Indicated (Patient is independent with ambulation and functional mobility, no further PT required.)   Discharge Equipment Recommendations:     Barriers to discharge: None    Assessment:     Anna Brock is a 38 y.o. female admitted with a medical diagnosis of Cellulitis of hand, right.  She presents with the following impairments/functional limitations:  (no imparments identified) .      Recent Surgery: * No surgery found *      Plan:     During this hospitalization, patient to be seen   to address the identified rehab impairments via   and progress toward the following goals:    Plan of Care Expires:       Subjective     Chief Complaint: swelling to finger  Patient/Family Comments/goals: go home, figure out what is causing swelling  Pain/Comfort:       Patients cultural, spiritual, Uatsdin conflicts given the current situation: yes    Living Environment:  Patient lives in a one level home with 4 steps for entry with daughter and roommates  Prior to admission, patients level of function was independent.  Equipment used at home:  .  DME owned (not currently used): none.  Upon discharge, patient will have assistance from daughter.    Objective:     Communicated with RN prior to session.  Patient found HOB elevated with peripheral IV  upon PT entry to room.    General Precautions: Standard,    Orthopedic Precautions:    Braces:    Respiratory Status: Room air    Exams:  RLE Strength: WNL  LLE Strength: WNL    Functional Mobility:  Transfers:     Sit to Stand:  independence with no AD  Gait: 50' on level tile surface with no AD independently      AM-PAC 6 CLICK MOBILITY  Total Score:        Treatment & Education:  Role of PT    Patient left HOB elevated with all lines intact and call button in reach.    GOALS:   Multidisciplinary Problems       Physical Therapy Goals        Not on file                    History:     Past Medical History:   Diagnosis Date    Anxiety     Depression        Past Surgical History:   Procedure Laterality Date    APPENDECTOMY      BREAST CYST EXCISION Left     DILATION AND CURETTAGE OF UTERUS      FINGER AMPUTATION Left 3/12/2020    Procedure: AMPUTATION, FINGER;  Surgeon: Lucian Fairchild DO;  Location: [facility] OR;  Service: Orthopedics;  Laterality: Left;  Amputation of the Distal Phalanx, Left Small Finger      HYSTERECTOMY      OPEN REDUCTION AND INTERNAL FIXATION (ORIF) OF INJURY OF FINGER Left 9/26/2019    Procedure: ORIF, FINGER;  Surgeon: Lucian Fairchild DO;  Location: [facility] OR;  Service: Orthopedics;  Laterality: Left;  Equipment: [employer] Mini Frag Set/ Hand Set  Vendor/Rep: [employer]  C-Arm: Entire  REQUIRES FIRST ASSISTANT ITZEL      TENOTOMY Left 3/12/2020    Procedure: TENOTOMY;  Surgeon: Lucian Fairchild DO;  Location: [facility] OR;  Service: Orthopedics;  Laterality: Left;       Time Tracking:     PT Received On:    PT Start Time: 0800     PT Stop Time: 0820  PT Total Time (min): 20 min     Billable Minutes: Evaluation 10 and Gait Training 10      04/29/2024

## 2024-04-30 VITALS
SYSTOLIC BLOOD PRESSURE: 112 MMHG | WEIGHT: 148.81 LBS | HEART RATE: 56 BPM | OXYGEN SATURATION: 100 % | DIASTOLIC BLOOD PRESSURE: 55 MMHG | BODY MASS INDEX: 24.79 KG/M2 | TEMPERATURE: 98 F | HEIGHT: 65 IN | RESPIRATION RATE: 18 BRPM

## 2024-04-30 LAB
ALBUMIN SERPL BCP-MCNC: 3 G/DL (ref 3.5–5.2)
ALP SERPL-CCNC: 90 U/L (ref 55–135)
ALT SERPL W/O P-5'-P-CCNC: 26 U/L (ref 10–44)
ANION GAP SERPL CALC-SCNC: 9 MMOL/L (ref 8–16)
AST SERPL-CCNC: 17 U/L (ref 10–40)
BACTERIA BLD CULT: NORMAL
BACTERIA BLD CULT: NORMAL
BASOPHILS # BLD AUTO: 0.1 K/UL (ref 0–0.2)
BASOPHILS NFR BLD: 1.3 % (ref 0–1.9)
BILIRUB SERPL-MCNC: 0.2 MG/DL (ref 0.1–1)
BUN SERPL-MCNC: 9 MG/DL (ref 6–20)
CALCIUM SERPL-MCNC: 8.9 MG/DL (ref 8.7–10.5)
CHLORIDE SERPL-SCNC: 105 MMOL/L (ref 95–110)
CO2 SERPL-SCNC: 25 MMOL/L (ref 23–29)
CREAT SERPL-MCNC: 0.8 MG/DL (ref 0.5–1.4)
DIFFERENTIAL METHOD BLD: ABNORMAL
EOSINOPHIL # BLD AUTO: 0.8 K/UL (ref 0–0.5)
EOSINOPHIL NFR BLD: 10.1 % (ref 0–8)
ERYTHROCYTE [DISTWIDTH] IN BLOOD BY AUTOMATED COUNT: 13.1 % (ref 11.5–14.5)
EST. GFR  (NO RACE VARIABLE): >60 ML/MIN/1.73 M^2
GLUCOSE SERPL-MCNC: 87 MG/DL (ref 70–110)
HCT VFR BLD AUTO: 39.5 % (ref 37–48.5)
HGB BLD-MCNC: 13.3 G/DL (ref 12–16)
IMM GRANULOCYTES # BLD AUTO: 0.03 K/UL (ref 0–0.04)
IMM GRANULOCYTES NFR BLD AUTO: 0.4 % (ref 0–0.5)
LYMPHOCYTES # BLD AUTO: 2.2 K/UL (ref 1–4.8)
LYMPHOCYTES NFR BLD: 29.6 % (ref 18–48)
MCH RBC QN AUTO: 32.7 PG (ref 27–31)
MCHC RBC AUTO-ENTMCNC: 33.7 G/DL (ref 32–36)
MCV RBC AUTO: 97 FL (ref 82–98)
MONOCYTES # BLD AUTO: 0.7 K/UL (ref 0.3–1)
MONOCYTES NFR BLD: 8.8 % (ref 4–15)
NEUTROPHILS # BLD AUTO: 3.7 K/UL (ref 1.8–7.7)
NEUTROPHILS NFR BLD: 49.8 % (ref 38–73)
NRBC BLD-RTO: 0 /100 WBC
PLATELET # BLD AUTO: 348 K/UL (ref 150–450)
PMV BLD AUTO: 9.3 FL (ref 9.2–12.9)
POTASSIUM SERPL-SCNC: 4.1 MMOL/L (ref 3.5–5.1)
PROT SERPL-MCNC: 6.8 G/DL (ref 6–8.4)
RBC # BLD AUTO: 4.07 M/UL (ref 4–5.4)
SODIUM SERPL-SCNC: 139 MMOL/L (ref 136–145)
WBC # BLD AUTO: 7.49 K/UL (ref 3.9–12.7)

## 2024-04-30 PROCEDURE — S4991 NICOTINE PATCH NONLEGEND: HCPCS | Performed by: INTERNAL MEDICINE

## 2024-04-30 PROCEDURE — 25000003 PHARM REV CODE 250: Performed by: INTERNAL MEDICINE

## 2024-04-30 PROCEDURE — G0378 HOSPITAL OBSERVATION PER HR: HCPCS

## 2024-04-30 PROCEDURE — 85025 COMPLETE CBC W/AUTO DIFF WBC: CPT | Performed by: INTERNAL MEDICINE

## 2024-04-30 PROCEDURE — 80053 COMPREHEN METABOLIC PANEL: CPT | Performed by: INTERNAL MEDICINE

## 2024-04-30 PROCEDURE — 99238 HOSP IP/OBS DSCHRG MGMT 30/<: CPT | Mod: ,,, | Performed by: INTERNAL MEDICINE

## 2024-04-30 PROCEDURE — 63600175 PHARM REV CODE 636 W HCPCS: Performed by: INTERNAL MEDICINE

## 2024-04-30 PROCEDURE — 96366 THER/PROPH/DIAG IV INF ADDON: CPT

## 2024-04-30 PROCEDURE — 94761 N-INVAS EAR/PLS OXIMETRY MLT: CPT

## 2024-04-30 RX ORDER — TRAMADOL HYDROCHLORIDE 50 MG/1
50 TABLET ORAL EVERY 6 HOURS PRN
Qty: 12 TABLET | Refills: 0 | Status: SHIPPED | OUTPATIENT
Start: 2024-04-30

## 2024-04-30 RX ORDER — CLINDAMYCIN HYDROCHLORIDE 300 MG/1
300 CAPSULE ORAL EVERY 6 HOURS
Qty: 56 CAPSULE | Refills: 0 | Status: SHIPPED | OUTPATIENT
Start: 2024-04-30 | End: 2024-05-14

## 2024-04-30 RX ADMIN — NICOTINE 1 PATCH: 21 PATCH, EXTENDED RELEASE TRANSDERMAL at 09:04

## 2024-04-30 RX ADMIN — VANCOMYCIN HYDROCHLORIDE 1500 MG: 1.5 INJECTION, POWDER, LYOPHILIZED, FOR SOLUTION INTRAVENOUS at 12:04

## 2024-04-30 RX ADMIN — CIPROFLOXACIN 400 MG: 2 INJECTION, SOLUTION INTRAVENOUS at 02:04

## 2024-04-30 NOTE — DISCHARGE SUMMARY
[facility] Medicine  Discharge Summary      Patient Name: Anna Brock  MRN: [id number]  STEPHANIE: [id number]  Patient Class: OP- Observation  Admission Date: 4/28/2024  Hospital Length of Stay: 0 days  Discharge Date and Time:  04/30/2024 10:20 AM  Attending Physician: Arnaldo aHrrell MD   Discharging Provider: Arnaldo Harrell MD  Primary Care Provider: Amelia Primary Doctor    Primary Care Team: Networked reference to record PCT     HPI:        Chief Complaint   Patient presents with    Cellulitis       Patient with significant cellulitis to the rt hand . Red streaking to rt axilla. Seen in ER placed on Abx x 4 days ago .      38-year-old female with history of depression, anxiety returns to the ED with complaints of worsening cellulitis to right 3rd finger.  She reports that she noticed increased surrounding erythema yesterday. She was seen here 3 days ago for the same, and was subsequently discharged with a prescription for Clindamycin for which she tells me she has been compliant.  Initial injury was an accident laceration with pocket knife, which got infected, and she decided to open the skin herself to facilitate drainage but apparently there was no drainage output.         Review of patient's allergies indicates:  No Known Allergies       Past Medical History:   Diagnosis Date    Anxiety      Depression     Patient denies any other past medical history.  Patient denies fever chills nausea or vomiting.  Patient denies any injury and denies any known bite sites.    * No surgery found *      Hospital Course:   Patient is a 38-year-old female that presented with redness and swelling of her right 3rd digit and hand.  Patient was found to have a cellulitis with possible abscess of her right 3rd digit.  The source of this infection is unclear at this time.  But she did cut herself with a pocket knife some days before this.  Patient was admitted placed on empiric antibiotics of  "vancomycin and has improved significantly during her admission and antibiotic therapy.  On day of discharge the finger is mildly erythematous but less tender in the hand is free of erythema and swelling at this time.  It looks to be healing well.  I feel this patient will need proximally 14 days of antibiotic therapy post discharge.  I feel she is ready for discharge at this time in can complete her antibiotic therapy as an outpatient.  She denies pain and denies fever chills nausea and vomiting.  Vital signs are stable and white blood cell count is normal.  Patient will be followed up with a primary care physician that we arrange prior to discharge.  Patient will be placed on clindamycin 300 mg q.6 hours for period of 14 days.  Patient discharged in stable condition.     Goals of Care Treatment Preferences:  Code Status: Full Code      Consults:   Consults (From admission, onward)          Status Ordering Provider     Pharmacy to dose Vancomycin consult  Once        Provider:  (Not yet assigned)   Placed in "And" Linked Group    Acknowledged ANGELI BELTRAN            No new Assessment & Plan notes have been filed under this hospital service since the last note was generated.  Service: Hospital Medicine    Final Active Diagnoses:    Diagnosis Date Noted POA    PRINCIPAL PROBLEM:  Cellulitis of hand, right [L03.113] 04/28/2024 Yes    Tobacco abuse [Z72.0] 04/28/2024 Yes      Problems Resolved During this Admission:       Discharged Condition: stable    Disposition: Home or Self Care    Follow Up:   Follow-up Information       Salena Gunter FNP. Go on 5/7/2024.    Specialty: Family Medicine  Why: [facility] appointment Tuesday May 7th at 11:00am for hospital follow up; ARRIVE BY 10:30am FOR PAPERWORK, BRING $40 COPAY, PICTURE ID, & PROOF OF INCOME OR LETTER FROM SOMEONE SUPPORTING YOU, BRING ALL DISCHARGE PAPERWORK WITH YOU  Contact information:  [address] [place] [address] [place] [id number] [phone]       "                     Patient Instructions:      Diet Adult Regular     Notify your health care provider if you experience any of the following:  temperature >100.4     Notify your health care provider if you experience any of the following:  persistent nausea and vomiting or diarrhea     Notify your health care provider if you experience any of the following:  severe uncontrolled pain     Notify your health care provider if you experience any of the following:  redness, tenderness, or signs of infection (pain, swelling, redness, odor or green/yellow discharge around incision site)     Notify your health care provider if you experience any of the following:  difficulty breathing or increased cough     Activity as tolerated       Significant Diagnostic Studies: Labs: All labs within the past 24 hours have been reviewed    Pending Diagnostic Studies:       None           Medications:  Reconciled Home Medications:      Medication List        START taking these medications      traMADoL 50 mg tablet  Commonly known as: ULTRAM  Take 1 tablet (50 mg total) by mouth every 6 (six) hours as needed for Pain.            CONTINUE taking these medications      clindamycin 300 MG capsule  Commonly known as: CLEOCIN  Take 1 capsule (300 mg total) by mouth every 6 (six) hours. for 14 days     ondansetron 4 MG tablet  Commonly known as: ZOFRAN  Take 1 tablet (4 mg total) by mouth every 6 (six) hours as needed for Nausea.            STOP taking these medications      amoxicillin 875 MG tablet  Commonly known as: AMOXIL              Indwelling Lines/Drains at time of discharge:   Lines/Drains/Airways       None                   Time spent on the discharge of patient: 25 minutes         Arnaldo Harrell MD  Department of Hospital Medicine  [facility]

## 2024-04-30 NOTE — PROGRESS NOTES
Pharmacokinetic Assessment Follow Up: IV Vancomycin    Vancomycin serum concentration assessment(s):    The trough level was drawn correctly and can be used to guide therapy at this time. The measurement is below the desired definitive target range of 10 to 15 mcg/mL.    Vancomycin Regimen Plan:    Change regimen to Vancomycin 1500 mg IV every 12 hours with next serum trough concentration measured at 11:00 prior to 4th dose on 5/1    Drug levels (last 3 results):  Recent Labs   Lab Result Units 04/29/24  2254   Vancomycin-Trough ug/mL 6.6*       Pharmacy will continue to follow and monitor vancomycin.    Please contact pharmacy for questions regarding this assessment.    Thank you for the consult,   Tam Weinberg       Patient brief summary:  Anna Brock is a 38 y.o. female initiated on antimicrobial therapy with IV Vancomycin for treatment of skin & soft tissue infection      Drug Allergies:   Review of patient's allergies indicates:  No Known Allergies    Actual Body Weight:   67.5kg    Renal Function:   Estimated Creatinine Clearance: 99 mL/min (based on SCr of 0.7 mg/dL).,     Dialysis Method (if applicable):  N/A    CBC (last 72 hours):  Recent Labs   Lab Result Units 04/28/24  1230 04/29/24  0610   WBC K/uL 10.21 7.48   Hemoglobin g/dL 12.9 12.9   Hematocrit % 37.7 39.0   Platelets K/uL 302 296   Gran % % 68.1 47.2   Lymph % % 17.2* 29.1   Mono % % 7.5 10.6   Eosinophil % % 6.2 11.5*   Basophil % % 0.8 1.2   Differential Method  Automated Automated       Metabolic Panel (last 72 hours):  Recent Labs   Lab Result Units 04/28/24  1230 04/29/24  0610   Sodium mmol/L 137 140   Potassium mmol/L 3.6 3.8   Chloride mmol/L 102 107   CO2 mmol/L 25 24   Glucose mg/dL 86 104   BUN mg/dL 12 8   Creatinine mg/dL 0.8 0.7   Albumin g/dL 3.4* 2.8*   Total Bilirubin mg/dL 0.3 0.1   Alkaline Phosphatase U/L 98 85   AST U/L 24 18   ALT U/L 34 27       Vancomycin Administrations:  vancomycin given in the last 96 hours                      vancomycin 1,500 mg in dextrose 5 % (D5W) 250 mL IVPB (Vial-Mate) (mg) 1,500 mg New Bag 04/30/24 0011    vancomycin (VANCOCIN) 1,000 mg in dextrose 5 % (D5W) 250 mL IVPB (Vial-Mate) (mg) 1,000 mg New Bag 04/29/24 1144     1,000 mg New Bag 04/28/24 2345    vancomycin 1,250 mg in dextrose 5 % (D5W) 250 mL IVPB (Vial-Mate) (mg) 1,250 mg New Bag 04/28/24 1236                    Microbiologic Results:  Microbiology Results (last 7 days)       Procedure Component Value Units Date/Time    Blood culture (site 1) [[id number]] Collected: 04/28/24 1343    Order Status: Completed Specimen: Blood Updated: 04/29/24 2213     Blood Culture, Routine No Growth to date      No Growth to date    Narrative:      Site # 1, aerobic and anaerobic

## 2024-04-30 NOTE — HOSPITAL COURSE
Patient is a 38-year-old female that presented with redness and swelling of her right 3rd digit and hand.  Patient was found to have a cellulitis with possible abscess of her right 3rd digit.  The source of this infection is unclear at this time.  But she did cut herself with a pocket knife some days before this.  Patient was admitted placed on empiric antibiotics of vancomycin and has improved significantly during her admission and antibiotic therapy.  On day of discharge the finger is mildly erythematous but less tender in the hand is free of erythema and swelling at this time.  It looks to be healing well.  I feel this patient will need proximally 14 days of antibiotic therapy post discharge.  I feel she is ready for discharge at this time in can complete her antibiotic therapy as an outpatient.  She denies pain and denies fever chills nausea and vomiting.  Vital signs are stable and white blood cell count is normal.  Patient will be followed up with a primary care physician that we arrange prior to discharge.  Patient will be placed on clindamycin 300 mg q.6 hours for period of 14 days.  Patient discharged in stable condition.

## 2024-04-30 NOTE — NURSING
Discharge instructions gone over with patient who verbalized understanding of new medications and follow up appointments. IV taken out with cath still in place. Patient tolerated well. Patient walked off unit when ride arrived.

## 2024-04-30 NOTE — PLAN OF CARE
[place] [facility] Care Unit  Discharge Final Note    Primary Care Provider: Amelia, Primary Doctor    Expected Discharge Date: 4/30/2024    Verbal follow up appointment with Mary Gunter NP with [facility] provided to patient. Informed her to be there 30min early for paperwork & to bring $40 copay & all discharge paperwork. Voucher for [facility] provided to her to use for any non-narcotic medications. Explained to her how much it covers & where the pharmacy is located. Phone number for [facility] assistance phone number & [facility] list provided to her. Demonstrated understanding by verbal feedback.States she should have a ride home as waiting for one of her friends to call her back.   OK for DC from CM standpoint.        Final Discharge Note (most recent)       Final Note - 04/30/24 1044          Final Note    Assessment Type Final Discharge Note     Anticipated Discharge Disposition Home or Self Care     What phone number can be called within the next 1-3 days to see how you are doing after discharge? [phone]     Hospital Resources/Appts/Education Provided Appointments scheduled and added to AVS;Community resources provided        Post-Acute Status    Discharge Delays None known at this time                     Important Message from Medicare             Contact Info       Salena Gunter FNP   Specialty: Family Medicine    [address] [place] [address] [place] [id number]   Phone: [phone]       Next Steps: Go on 5/7/2024    Instructions: [facility] appointment Tuesday May 7th at 11:00am for hospital follow up; ARRIVE BY 10:30am FOR PAPERWORK, BRING $40 COPAY, PICTURE ID, & PROOF OF INCOME OR LETTER FROM SOMEONE SUPPORTING YOU, BRING ALL DISCHARGE PAPERWORK WITH YOU

## 2024-04-30 NOTE — PLAN OF CARE
Problem: Adult Inpatient Plan of Care  Goal: Plan of Care Review  Outcome: Met  Goal: Patient-Specific Goal (Individualized)  Outcome: Met  Goal: Absence of Hospital-Acquired Illness or Injury  Outcome: Met  Goal: Optimal Comfort and Wellbeing  Outcome: Met  Goal: Readiness for Transition of Care  Outcome: Met     Problem: Infection  Goal: Absence of Infection Signs and Symptoms  Outcome: Met     Problem: Pain Acute  Goal: Optimal Pain Control and Function  Outcome: Met

## 2024-04-30 NOTE — PLAN OF CARE
Problem: Adult Inpatient Plan of Care  Goal: Plan of Care Review  Outcome: Progressing     Problem: Adult Inpatient Plan of Care  Goal: Patient-Specific Goal (Individualized)  Outcome: Progressing     Problem: Adult Inpatient Plan of Care  Goal: Absence of Hospital-Acquired Illness or Injury  Outcome: Progressing     Problem: Adult Inpatient Plan of Care  Goal: Optimal Comfort and Wellbeing  Outcome: Progressing     Problem: Infection  Goal: Absence of Infection Signs and Symptoms  Outcome: Progressing     Problem: Pain Acute  Goal: Optimal Pain Control and Function  Outcome: Progressing    Pt Aox4 throughout shift. VSS. Free from falls and skin breakdown. Bed in lowest position, locked, alarm set, side rails raised x2, and call light placed within reach. NAD noted. Rounding done hourly. All questions answered, no complaints at this time. Plan of care ongoing.

## 2024-05-03 LAB — BACTERIA BLD CULT: NORMAL

## (undated) DEVICE — SEE MEDLINE ITEM 152522

## (undated) DEVICE — DRESSING N ADH OIL EMUL 3X3

## (undated) DEVICE — BANDAGE SOFFORM STER 2IN

## (undated) DEVICE — GOWN B1 X-LG X-LONG

## (undated) DEVICE — TOURNIQUET SB QC SP 18X4IN

## (undated) DEVICE — DRESSING TEGADERM 2X2 3/4

## (undated) DEVICE — BLADE SURGICAL S/S STR #15

## (undated) DEVICE — GLOVE PI ULTRA TOUCH G SURGEON

## (undated) DEVICE — GLOVE SURG ULTRA TOUCH 7

## (undated) DEVICE — NDL ELECTRODE E-Z CLEAN 2.75IN

## (undated) DEVICE — BLADE #15 STERILE CARBON

## (undated) DEVICE — DRESSING ADAPTIC TOUCH 3X4

## (undated) DEVICE — SUT ETHILON 3-0 FS-1 30

## (undated) DEVICE — GLOVE SURGEONS ULTRA TOUCH 6.5

## (undated) DEVICE — SEE MEDLINE ITEM 156964

## (undated) DEVICE — SUT 4/0 18IN ETHILON BL P3

## (undated) DEVICE — DRAPE PLASTIC U 60X72

## (undated) DEVICE — SHEET DRAPE FAN-FOLDED 3/4

## (undated) DEVICE — GLOVE SURG ULTRA TOUCH 8.5

## (undated) DEVICE — Device

## (undated) DEVICE — SUT ETHILON BL MONO P3

## (undated) DEVICE — DRAPE STERI INSTRUMENT 1018

## (undated) DEVICE — GLOVE SURG ULTRA TOUCH 9

## (undated) DEVICE — SUT ETHILON 4-0 PS2 18 BLK

## (undated) DEVICE — SPONGE/BRUSH SCRUB SURG PCMX

## (undated) DEVICE — ELECTRODE REM PLYHSV RETURN 9

## (undated) DEVICE — SLEEVE SCD EXPRESS CALF MEDIUM

## (undated) DEVICE — SEE MEDLINE ITEM 146298

## (undated) DEVICE — CANISTER SUCTION 3000CC

## (undated) DEVICE — DRAPE C-ARM FOR MOBILE XRAY

## (undated) DEVICE — SPONGE DERMA 8PLY 2X2

## (undated) DEVICE — WRAP COBAN SELF ADH ASST 3IN

## (undated) DEVICE — SYR B-D DISP CONTROL 10CC100/C

## (undated) DEVICE — DRESSING XEROFORM FOIL 4X4